# Patient Record
Sex: MALE | Employment: STUDENT | URBAN - METROPOLITAN AREA
[De-identification: names, ages, dates, MRNs, and addresses within clinical notes are randomized per-mention and may not be internally consistent; named-entity substitution may affect disease eponyms.]

---

## 2019-03-15 ENCOUNTER — TRANSFERRED RECORDS (OUTPATIENT)
Dept: HEALTH INFORMATION MANAGEMENT | Facility: CLINIC | Age: 29
End: 2019-03-15

## 2019-03-15 ENCOUNTER — MEDICAL CORRESPONDENCE (OUTPATIENT)
Dept: HEALTH INFORMATION MANAGEMENT | Facility: CLINIC | Age: 29
End: 2019-03-15

## 2019-03-22 ENCOUNTER — TELEPHONE (OUTPATIENT)
Dept: CARDIOLOGY | Facility: CLINIC | Age: 29
End: 2019-03-22

## 2019-03-22 NOTE — TELEPHONE ENCOUNTER
Called patient and cancelled Monday's appt with Dr. Felix. Will have congenital team reach out to patient for appropriate scheduling.

## 2019-04-01 ENCOUNTER — TELEPHONE (OUTPATIENT)
Dept: CARDIOLOGY | Facility: CLINIC | Age: 29
End: 2019-04-01

## 2019-04-01 DIAGNOSIS — Q24.0 DEXTROCARDIA: Primary | ICD-10-CM

## 2019-04-01 DIAGNOSIS — I07.1 TRICUSPID REGURGITATION: ICD-10-CM

## 2019-04-03 ENCOUNTER — TELEPHONE (OUTPATIENT)
Dept: CARDIOLOGY | Facility: CLINIC | Age: 29
End: 2019-04-03

## 2019-04-06 ENCOUNTER — TELEPHONE (OUTPATIENT)
Dept: CARDIOLOGY | Facility: CLINIC | Age: 29
End: 2019-04-06

## 2019-04-24 ENCOUNTER — CARE COORDINATION (OUTPATIENT)
Dept: CARDIOLOGY | Facility: CLINIC | Age: 29
End: 2019-04-24

## 2019-05-10 ENCOUNTER — ANCILLARY PROCEDURE (OUTPATIENT)
Dept: CARDIOLOGY | Facility: CLINIC | Age: 29
End: 2019-05-10
Attending: INTERNAL MEDICINE
Payer: COMMERCIAL

## 2019-05-10 DIAGNOSIS — I07.1 TRICUSPID REGURGITATION: ICD-10-CM

## 2019-05-10 DIAGNOSIS — Q24.0 DEXTROCARDIA: ICD-10-CM

## 2019-05-10 LAB
ALBUMIN SERPL-MCNC: 4.2 G/DL (ref 3.4–5)
ALP SERPL-CCNC: 65 U/L (ref 40–150)
ALT SERPL W P-5'-P-CCNC: 38 U/L (ref 0–70)
ANION GAP SERPL CALCULATED.3IONS-SCNC: 8 MMOL/L (ref 3–14)
AST SERPL W P-5'-P-CCNC: 34 U/L (ref 0–45)
BASOPHILS # BLD AUTO: 0.1 10E9/L (ref 0–0.2)
BASOPHILS NFR BLD AUTO: 1 %
BILIRUB SERPL-MCNC: 0.6 MG/DL (ref 0.2–1.3)
BUN SERPL-MCNC: 14 MG/DL (ref 7–30)
CALCIUM SERPL-MCNC: 9.5 MG/DL (ref 8.5–10.1)
CHLORIDE SERPL-SCNC: 107 MMOL/L (ref 94–109)
CHOLEST SERPL-MCNC: 205 MG/DL
CO2 SERPL-SCNC: 23 MMOL/L (ref 20–32)
CREAT SERPL-MCNC: 0.88 MG/DL (ref 0.66–1.25)
DIFFERENTIAL METHOD BLD: NORMAL
EOSINOPHIL # BLD AUTO: 0.2 10E9/L (ref 0–0.7)
EOSINOPHIL NFR BLD AUTO: 2.1 %
ERYTHROCYTE [DISTWIDTH] IN BLOOD BY AUTOMATED COUNT: 12 % (ref 10–15)
GFR SERPL CREATININE-BSD FRML MDRD: >90 ML/MIN/{1.73_M2}
GLUCOSE SERPL-MCNC: 91 MG/DL (ref 70–99)
HCT VFR BLD AUTO: 48.2 % (ref 40–53)
HDLC SERPL-MCNC: 45 MG/DL
HGB BLD-MCNC: 16.3 G/DL (ref 13.3–17.7)
IMM GRANULOCYTES # BLD: 0 10E9/L (ref 0–0.4)
IMM GRANULOCYTES NFR BLD: 0.1 %
LDLC SERPL CALC-MCNC: 93 MG/DL
LYMPHOCYTES # BLD AUTO: 2.4 10E9/L (ref 0.8–5.3)
LYMPHOCYTES NFR BLD AUTO: 30.5 %
MCH RBC QN AUTO: 29.9 PG (ref 26.5–33)
MCHC RBC AUTO-ENTMCNC: 33.8 G/DL (ref 31.5–36.5)
MCV RBC AUTO: 88 FL (ref 78–100)
MONOCYTES # BLD AUTO: 0.5 10E9/L (ref 0–1.3)
MONOCYTES NFR BLD AUTO: 6.9 %
NEUTROPHILS # BLD AUTO: 4.6 10E9/L (ref 1.6–8.3)
NEUTROPHILS NFR BLD AUTO: 59.4 %
NONHDLC SERPL-MCNC: 159 MG/DL
NRBC # BLD AUTO: 0 10*3/UL
NRBC BLD AUTO-RTO: 0 /100
PLATELET # BLD AUTO: 246 10E9/L (ref 150–450)
POTASSIUM SERPL-SCNC: 4.2 MMOL/L (ref 3.4–5.3)
PROT SERPL-MCNC: 8.5 G/DL (ref 6.8–8.8)
RBC # BLD AUTO: 5.45 10E12/L (ref 4.4–5.9)
SODIUM SERPL-SCNC: 138 MMOL/L (ref 133–144)
TRIGL SERPL-MCNC: 331 MG/DL
TSH SERPL DL<=0.005 MIU/L-ACNC: 1.73 MU/L (ref 0.4–4)
WBC # BLD AUTO: 7.8 10E9/L (ref 4–11)

## 2019-05-14 ENCOUNTER — OFFICE VISIT (OUTPATIENT)
Dept: CARDIOLOGY | Facility: CLINIC | Age: 29
End: 2019-05-14
Payer: COMMERCIAL

## 2019-05-14 ENCOUNTER — ANCILLARY PROCEDURE (OUTPATIENT)
Dept: CARDIOLOGY | Facility: CLINIC | Age: 29
End: 2019-05-14
Attending: INTERNAL MEDICINE
Payer: COMMERCIAL

## 2019-05-14 VITALS
WEIGHT: 186.3 LBS | HEART RATE: 70 BPM | OXYGEN SATURATION: 98 % | DIASTOLIC BLOOD PRESSURE: 79 MMHG | SYSTOLIC BLOOD PRESSURE: 120 MMHG

## 2019-05-14 DIAGNOSIS — R00.0 TACHYCARDIA: Primary | ICD-10-CM

## 2019-05-14 DIAGNOSIS — Q24.9 CONGENITAL HEART ANOMALY: ICD-10-CM

## 2019-05-14 DIAGNOSIS — R00.0 TACHYCARDIA: ICD-10-CM

## 2019-05-14 PROCEDURE — 93010 ELECTROCARDIOGRAM REPORT: CPT | Mod: ZP | Performed by: INTERNAL MEDICINE

## 2019-05-14 PROCEDURE — G0463 HOSPITAL OUTPT CLINIC VISIT: HCPCS

## 2019-05-14 PROCEDURE — 0298T ZZC EXT ECG > 48HR TO 21 DAY REVIEW AND INTERPRETATN: CPT | Performed by: INTERNAL MEDICINE

## 2019-05-14 PROCEDURE — 99203 OFFICE O/P NEW LOW 30 MIN: CPT | Mod: ZP | Performed by: INTERNAL MEDICINE

## 2019-05-14 PROCEDURE — 93005 ELECTROCARDIOGRAM TRACING: CPT | Mod: XU

## 2019-05-14 PROCEDURE — 0296T ZIO PATCH HOLTER ADULT PEDIATRIC GREATER THAN 48 HRS: CPT | Mod: ZF

## 2019-05-14 SDOH — HEALTH STABILITY: MENTAL HEALTH: HOW OFTEN DO YOU HAVE A DRINK CONTAINING ALCOHOL?: NOT ASKED

## 2019-05-14 NOTE — NURSING NOTE
"Chief Complaint   Patient presents with     New Patient     28 yo male with PMH significant for dextrocardia and \"failing valve\" presenting for evaluation     Vitals were taken and medications were reconciled.     Chrissie Whalen RMA  2:15 PM    "

## 2019-05-14 NOTE — NURSING NOTE
"Chief Complaint   Patient presents with     New Patient     28 yo male with PMH significant for dextrocardia and \"failing valve\" presenting for evaluation        Cardiac Monitors: Patient was instructed regarding the indication, function, care and prompt return of a holter  monitor. The monitor was placed on the patient with instructions regarding care of the skin electrodes and monitor, as well as documentation in the patient diary. Patient demonstrated understanding of this information and agreed to call with further questions or concerns.  Cardiac Testing: Patient given instructions regarding  CPX and MRI. Discussed purpose, preparation, procedure and when to expect results reported back to the patient. Patient demonstrated understanding of this information and agreed to call with further questions or concerns.  Med Reconcile: Reviewed and verified all current medications with the patient. The updated medication list was printed and given to the patient.  Return Appointment: Patient given instructions regarding scheduling next clinic visit. Patient demonstrated understanding of this information and agreed to call with further questions or concerns.  Patient stated he understood all health information given and agreed to call with further questions or concerns.     Bart Johnson RN, BSN  Cardiology Care Coordinator  [facility]  [email or web address]  [phone]       "

## 2019-05-14 NOTE — LETTER
5/14/2019      RE: Robert Ge  [address]  [facility] [address]       Dear Colleague,    Thank you for the opportunity to participate in the care of your patient, Robert Ge, at the [facility]ART CARE at [facility]. Please see a copy of my visit note below.    CARDIOLOGY CONSULTATION:  [facility]    Please see dictated note    PAST MEDICAL HISTORY:  No past medical history on file.    CURRENT MEDICATIONS:  No current outpatient medications on file.       PAST SURGICAL HISTORY:  No past surgical history on file.    ALLERGIES  Patient has no known allergies.    FAMILY HX:  No family history on file.    SOCIAL HX:  Social History     Socioeconomic History     Marital status: Single     Spouse name: None     Number of children: None     Years of education: None     Highest education level: None   Occupational History     None   Social Needs     Financial resource strain: None     Food insecurity:     Worry: None     Inability: None     Transportation needs:     Medical: None     Non-medical: None   Tobacco Use     Smoking status: Never Smoker     Smokeless tobacco: Never Used   Substance and Sexual Activity     Alcohol use: Yes     Drug use: Never     Sexual activity: None   Lifestyle     Physical activity:     Days per week: None     Minutes per session: None     Stress: None   Relationships     Social connections:     Talks on phone: None     Gets together: None     Attends Protestant service: None     Active member of club or organization: None     Attends meetings of clubs or organizations: None     Relationship status: None     Intimate partner violence:     Fear of current or ex partner: None     Emotionally abused: None     Physically abused: None     Forced sexual activity: None   Other Topics Concern     None   Social History Narrative     None       ROS:  Constitutional: No fever, chills, or sweats. No weight gain/loss.   ENT: No  visual disturbance, ear ache, epistaxis, sore throat.   Allergies/Immunologic: Negative.   Respiratory: No cough, hemoptysis.   Cardiovascular: As per HPI.   GI: No nausea, vomiting, hematemesis, melena, or hematochezia.   : No urinary frequency, dysuria, or hematuria.   Integument: Negative.   Psychiatric: Negative.   Neuro: Negative.   Endocrinology: Negative.   Musculoskeletal: No myalgia.    VITAL SIGNS:  /79   Pulse 70   Wt 84.5 kg (186 lb 4.8 oz)   SpO2 98%   There is no height or weight on file to calculate BMI.  Wt Readings from Last 2 Encounters:   05/14/19 84.5 kg (186 lb 4.8 oz)       PHYSICAL EXAM  Robert Ge IS A 29 year old male.in no acute distress.  HEENT: Unremarkable.  Neck: JVP normal.  Carotids +4/4 bilaterally without bruits.  Lungs: CTA.  Cor: RRR. Normal S1 and S2.  + sternal lift. There is no significant murmur.  Abd: Soft, nontender, nondistended.  NABS.  No pulsatile mass.  Extremities: No C/C/E.  Pulses +4/4 symmetric in upper and lower extremities.  Neuro: Grossly intact.    LABS    Lab Results   Component Value Date    WBC 7.8 05/10/2019     Lab Results   Component Value Date    RBC 5.45 05/10/2019     Lab Results   Component Value Date    HGB 16.3 05/10/2019     Lab Results   Component Value Date    HCT 48.2 05/10/2019     No components found for: MCT  Lab Results   Component Value Date    MCV 88 05/10/2019     Lab Results   Component Value Date    MCH 29.9 05/10/2019     Lab Results   Component Value Date    MCHC 33.8 05/10/2019     Lab Results   Component Value Date    RDW 12.0 05/10/2019     Lab Results   Component Value Date     05/10/2019      Recent Labs   Lab Test 05/10/19  1223      POTASSIUM 4.2   CHLORIDE 107   CO2 23   ANIONGAP 8   GLC 91   BUN 14   CR 0.88   CAIT 9.5     Recent Labs   Lab Test 05/10/19  1223   CHOL 205*   HDL 45   LDL 93   TRIG 331*   NHDL 159*        GABRIEL Almaraz MD     Service Date: 05/14/2019      HISTORY OF PRESENT  ILLNESS:  Mr. 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  He has a past medical history significant for situs inversus totalis per report and an issue with his right side of his heart with a leaking valve.  He has not established with a cardiologist here in the [facility].  He does have an adult cardiologist back in [place] and had been followed by a pediatric cardiologist since he was 4-5 years old and this abnormality was discovered after he presented with a cough.  He was never limited in activity.  He played sports and was very active.  In his 20s he had an episode of palpitations but no other events up until a few weeks ago.  He awoke suddenly at 2:00 in the morning.  His heart was racing fast and he had a sharp chest pain and felt profoundly dizzy.  He went to his neighbor and they laid him down on the ground and his symptoms immediately resolved and he was back to normal.  This episode, however, scared him quite a bit, and he went to [place] to establish care and then referred to our program.  He did undergo an echocardiogram.  I reviewed those images personally.  His right ventricle appears to be moderately enlarged with low systolic function.  He just has mild AV valve regurgitation on that side.  We could not assess the pulmonary valve.  The left-sided AV valve is working well, and his saturations were normal.  He has never had any surgeries.  There is no family history of congenital heart disease or early coronary artery disease.  He has 1 brother and 1 sister.  His parents are living and healthy.  He smoked remotely, quit in 2014, just a pack a week for a couple of years.  He is actually fairly active.  He tries to run 5 miles at a time and now is able to complete that in about 45 minutes.  On the day that he had this event he had done a run around 2 p.m., felt good with it  and then had a full pizza around 8 p.m., but again it was not until 2 a.m. that he had this event.      He did undergo labs as part of his evaluation today.  His triglycerides are a bit up at 331.  His LDL is 93 with an HDL of 45.  His liver function tests are normal.  Creatinine is normal.  CBC is normal.  His glucose is 91.  Normal TSH at 1.7.      IMPRESSION, REPORT, PLAN:   1.  History of situs inversus totalis.   2.  Moderate RV enlargement with mildly decreased function of unclear etiology, query PI.   3.  Episode of palpitations associated with dizziness.   4.  Hypertriglyceridemia.      DISCUSSION:  It was a pleasure to be involved in the care of Mr. Nadiya Ge.  As noted, I reviewed all of his imaging personally.  At this time, I cannot find a clear etiology for his RV enlargement as we were unable to assess the pulmonary outflow.  It is possible that he has PI and that is the etiology, and they were unable to see it on the echo, which was limited.  I would like to get a cardiac MRI.  However, he is leaving this Friday to go to [place] until August, so we will plan to do it when he returns.  I also discussed doing a 14-day Zio Patch monitor so that if he has any additional events that we will be able to capture them, and doing a cardiopulmonary stress test to assess both cardiac limitation and pulmonary limitation.  I will plan to see him back and make further recommendations when more is available, and he will work on getting his records from [place].  It was a pleasure being involved in his care.  Please do not hesitate to contact me with any questions or concerns.         SANTOS MARIN MD             D: 2019   T: 2019   MT: KAVITA      Name:     LANNY DUCKWORTH   MRN:      [id number]        Account:      [id number]   :      1990           Service Date: 2019      Document: [id number]

## 2019-05-14 NOTE — PATIENT INSTRUCTIONS
You were seen today in the Adult Congenital and Cardiovascular Genetics Clinic at the [facility].    Cardiology Providers you saw during your visit:  Dr. Woodruff March     Diagnosis:  cognenital heart defect    Results:  The results of your echo were discussed with you today    Recommendations:    1.  Continue to eat a heart healthy, low salt diet.  2.  Continue to get 20-30 minutes of aerobic activity, 4-5 days per week.  Examples of aerobic activity include walking, running, swimming, cycling, etc.  3.  Continue to observe good oral hygiene, with regular dental visits.  4.  Please have a stress test this week - we will call you with the results.   5. Please wear a Ziopatch monitor for 14 days and we will call you with the results.  6. Please have a cardiac MRI/MRA in August.    Cardiac MRI  Magnetic resonance imaging (MRI) is a test that lets your doctor see detailed pictures of the inside of your body. MRI combines the use of strong magnets and radio waves to form an MRI image. A Cardiac MRI will give a detailed image of your heart.     Follow these instructions:  1. Please no eating or drinking 3 hours prior to the procedure.   2. No caffeine, alcohol or tobacco 12 hours prior to the procedure,    During the procedure:  1.Please arrive to the [facility] Waiting Room in the [facility].   2. You will be required to lay flat and follow breath-hold instructions.   3. You will need to remove all metal and answer a safety questionnaire. Please wear clothes without metal (snaps and zippers). Please remove any body piercings and hair extensions before you arrive. You will also remove watches, jewelry, hairpins, wallets, dentures, partial dental plates and hearing aids. You may wear contact lenses, and you may be able to wear your rings. We have a safe place to keep your personal items, but it is safer to leave them at home.  4. You will be given IV contrast for this exam.  To prepare:  The day before the exam  drink extra fluid - at least six 8oz glasses (unless you are on a fluid restriction per your doctor)       Vitals:    05/14/19 1413 05/14/19 1502   BP: 133/81 120/79   BP Location: Left arm    Patient Position: Chair    Cuff Size: Adult Regular    Pulse: 70    SpO2: 98%    Weight: 84.5 kg (186 lb 4.8 oz)      Exercise restrictions:   Yes__X__  No____         If yes, list restrictions:  Must be allowed to rest if fatigued or SOB      Work restrictions:  Yes____  No_X___         If yes, list restrictions:    FASTING CHOLESTEROL was checked in the last 5 years YES_X__  NO___   Continue to eat a heart healthy, low salt diet.         ____ Fasting lipid panel order today         ____ No changes in medications          ____ I recommend the following changes in your cholesterol medications.:          ____ Please follow up for cholesterol screening at your primary care physician      Follow-up:  Follow up with Dr. Almaraz in August with a cMRI/MRA.    If you have questions or concerns please contact us at:    Bart Johnson RN, BSN   Aries Martin (Scheduling)  Nurse Coordinator     Clinic   Adult Congenital and CV Genetics Adult Congenital and CV Genetic  [facility]  (P)[phone]    (P) [phone]  [email or web address] (F)[phone]        For after hours urgent needs, call [phone] and ask to speak to the Adult Congenital Physician on call.  Mention Job Code 0401.    For emergencies call 911.    [facility]   Clinics and Surgery Center  Mail Code [id number]  [address] [place] [address]  [id number]

## 2019-05-14 NOTE — PROGRESS NOTES
Per Dr. Almaraz, patient to have 14 day Zio monitor placed.  Diagnosis: Tachycardia  Monitor placed: Yes  Patient Instructed: Yes  Patient verbalized understanding: Yes  Holter # [id number]  Placed by Jan Sahu

## 2019-05-14 NOTE — PROGRESS NOTES
CARDIOLOGY CONSULTATION:  [facility]    Please see dictated note    PAST MEDICAL HISTORY:  No past medical history on file.    CURRENT MEDICATIONS:  No current outpatient medications on file.       PAST SURGICAL HISTORY:  No past surgical history on file.    ALLERGIES  Patient has no known allergies.    FAMILY HX:  No family history on file.    SOCIAL HX:  Social History     Socioeconomic History     Marital status: Single     Spouse name: None     Number of children: None     Years of education: None     Highest education level: None   Occupational History     None   Social Needs     Financial resource strain: None     Food insecurity:     Worry: None     Inability: None     Transportation needs:     Medical: None     Non-medical: None   Tobacco Use     Smoking status: Never Smoker     Smokeless tobacco: Never Used   Substance and Sexual Activity     Alcohol use: Yes     Drug use: Never     Sexual activity: None   Lifestyle     Physical activity:     Days per week: None     Minutes per session: None     Stress: None   Relationships     Social connections:     Talks on phone: None     Gets together: None     Attends Congregation service: None     Active member of club or organization: None     Attends meetings of clubs or organizations: None     Relationship status: None     Intimate partner violence:     Fear of current or ex partner: None     Emotionally abused: None     Physically abused: None     Forced sexual activity: None   Other Topics Concern     None   Social History Narrative     None       ROS:  Constitutional: No fever, chills, or sweats. No weight gain/loss.   ENT: No visual disturbance, ear ache, epistaxis, sore throat.   Allergies/Immunologic: Negative.   Respiratory: No cough, hemoptysis.   Cardiovascular: As per HPI.   GI: No nausea, vomiting, hematemesis, melena, or hematochezia.   : No urinary frequency, dysuria, or hematuria.   Integument: Negative.   Psychiatric: Negative.   Neuro: Negative.    Endocrinology: Negative.   Musculoskeletal: No myalgia.    VITAL SIGNS:  /79   Pulse 70   Wt 84.5 kg (186 lb 4.8 oz)   SpO2 98%   There is no height or weight on file to calculate BMI.  Wt Readings from Last 2 Encounters:   05/14/19 84.5 kg (186 lb 4.8 oz)       PHYSICAL EXAM  Robert Ge IS A 29 year old male.in no acute distress.  HEENT: Unremarkable.  Neck: JVP normal.  Carotids +4/4 bilaterally without bruits.  Lungs: CTA.  Cor: RRR. Normal S1 and S2.  + sternal lift. There is no significant murmur.  Abd: Soft, nontender, nondistended.  NABS.  No pulsatile mass.  Extremities: No C/C/E.  Pulses +4/4 symmetric in upper and lower extremities.  Neuro: Grossly intact.    LABS    Lab Results   Component Value Date    WBC 7.8 05/10/2019     Lab Results   Component Value Date    RBC 5.45 05/10/2019     Lab Results   Component Value Date    HGB 16.3 05/10/2019     Lab Results   Component Value Date    HCT 48.2 05/10/2019     No components found for: MCT  Lab Results   Component Value Date    MCV 88 05/10/2019     Lab Results   Component Value Date    MCH 29.9 05/10/2019     Lab Results   Component Value Date    MCHC 33.8 05/10/2019     Lab Results   Component Value Date    RDW 12.0 05/10/2019     Lab Results   Component Value Date     05/10/2019      Recent Labs   Lab Test 05/10/19  1223      POTASSIUM 4.2   CHLORIDE 107   CO2 23   ANIONGAP 8   GLC 91   BUN 14   CR 0.88   CAIT 9.5     Recent Labs   Lab Test 05/10/19  1223   CHOL 205*   HDL 45   LDL 93   TRIG 331*   NHDL 159*        GABRIEL Almaraz MD

## 2019-05-15 LAB — INTERPRETATION ECG - MUSE: NORMAL

## 2019-05-15 NOTE — PROGRESS NOTES
Service Date: 05/14/2019      HISTORY OF PRESENT ILLNESS:  Mr. 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  He has a past medical history significant for situs inversus totalis per report and an issue with his right side of his heart with a leaking valve.  He has not established with a cardiologist here in the [facility].  He does have an adult cardiologist back in [place] and had been followed by a pediatric cardiologist since he was 4-5 years old and this abnormality was discovered after he presented with a cough.  He was never limited in activity.  He played sports and was very active.  In his 20s he had an episode of palpitations but no other events up until a few weeks ago.  He awoke suddenly at 2:00 in the morning.  His heart was racing fast and he had a sharp chest pain and felt profoundly dizzy.  He went to his neighbor and they laid him down on the ground and his symptoms immediately resolved and he was back to normal.  This episode, however, scared him quite a bit, and he went to [place] to establish care and then referred to our program.  He did undergo an echocardiogram.  I reviewed those images personally.  His right ventricle appears to be moderately enlarged with low systolic function.  He just has mild AV valve regurgitation on that side.  We could not assess the pulmonary valve.  The left-sided AV valve is working well, and his saturations were normal.  He has never had any surgeries.  There is no family history of congenital heart disease or early coronary artery disease.  He has 1 brother and 1 sister.  His parents are living and healthy.  He smoked remotely, quit in 2014, just a pack a week for a couple of years.  He is actually fairly active.  He tries to run 5 miles at a time and now is able to complete that in about 45 minutes.  On the day that he had this event he  had done a run around 2 p.m., felt good with it and then had a full pizza around 8 p.m., but again it was not until 2 a.m. that he had this event.      He did undergo labs as part of his evaluation today.  His triglycerides are a bit up at 331.  His LDL is 93 with an HDL of 45.  His liver function tests are normal.  Creatinine is normal.  CBC is normal.  His glucose is 91.  Normal TSH at 1.7.      IMPRESSION, REPORT, PLAN:   1.  History of situs inversus totalis.   2.  Moderate RV enlargement with mildly decreased function of unclear etiology, query PI.   3.  Episode of palpitations associated with dizziness.   4.  Hypertriglyceridemia.      DISCUSSION:  It was a pleasure to be involved in the care of Mr. Nadiya Ge.  As noted, I reviewed all of his imaging personally.  At this time, I cannot find a clear etiology for his RV enlargement as we were unable to assess the pulmonary outflow.  It is possible that he has PI and that is the etiology, and they were unable to see it on the echo, which was limited.  I would like to get a cardiac MRI.  However, he is leaving this Friday to go to [place] until August, so we will plan to do it when he returns.  I also discussed doing a 14-day Zio Patch monitor so that if he has any additional events that we will be able to capture them, and doing a cardiopulmonary stress test to assess both cardiac limitation and pulmonary limitation.  I will plan to see him back and make further recommendations when more is available, and he will work on getting his records from [place].  It was a pleasure being involved in his care.  Please do not hesitate to contact me with any questions or concerns.         SANTOS MARIN MD             D: 2019   T: 2019   MT: KAVITA      Name:     LANNY DUCKWORTH   MRN:      [id number]        Account:      [id number]   :      1990           Service Date: 2019      Document: [id number]

## 2019-08-19 PROBLEM — Q24.9 CONGENITAL ANOMALIES OF THE HEART: Status: ACTIVE | Noted: 2019-08-19

## 2019-08-19 PROBLEM — Q89.3 SITUS INVERSUS TOTALIS: Status: ACTIVE | Noted: 2019-08-19

## 2019-09-06 ENCOUNTER — HOSPITAL ENCOUNTER (OUTPATIENT)
Dept: MRI IMAGING | Facility: CLINIC | Age: 29
Discharge: HOME | End: 2019-09-06
Attending: INTERNAL MEDICINE | Admitting: INTERNAL MEDICINE
Payer: COMMERCIAL

## 2019-09-06 ENCOUNTER — HOSPITAL ENCOUNTER (OUTPATIENT)
Dept: CARDIOLOGY | Facility: CLINIC | Age: 29
End: 2019-09-06
Attending: INTERNAL MEDICINE
Payer: COMMERCIAL

## 2019-09-06 ENCOUNTER — HOSPITAL ENCOUNTER (OUTPATIENT)
Dept: MRI IMAGING | Facility: CLINIC | Age: 29
End: 2019-09-06
Attending: INTERNAL MEDICINE
Payer: COMMERCIAL

## 2019-09-06 VITALS
HEIGHT: 70 IN | DIASTOLIC BLOOD PRESSURE: 73 MMHG | HEART RATE: 94 BPM | OXYGEN SATURATION: 95 % | SYSTOLIC BLOOD PRESSURE: 109 MMHG | BODY MASS INDEX: 25.6 KG/M2 | WEIGHT: 178.8 LBS

## 2019-09-06 DIAGNOSIS — R00.0 TACHYCARDIA: ICD-10-CM

## 2019-09-06 DIAGNOSIS — Q24.9 CONGENITAL HEART ANOMALY: ICD-10-CM

## 2019-09-06 PROCEDURE — 25500064 ZZH RX 255 OP 636: Performed by: INTERNAL MEDICINE

## 2019-09-06 PROCEDURE — 94621 CARDIOPULM EXERCISE TESTING: CPT

## 2019-09-06 PROCEDURE — A9585 GADOBUTROL INJECTION: HCPCS | Performed by: INTERNAL MEDICINE

## 2019-09-06 PROCEDURE — 75561 CARDIAC MRI FOR MORPH W/DYE: CPT

## 2019-09-06 PROCEDURE — 99214 OFFICE O/P EST MOD 30 MIN: CPT | Mod: ZP | Performed by: INTERNAL MEDICINE

## 2019-09-06 PROCEDURE — 71555 MRI ANGIO CHEST W OR W/O DYE: CPT

## 2019-09-06 PROCEDURE — 75561 CARDIAC MRI FOR MORPH W/DYE: CPT | Mod: 26 | Performed by: INTERNAL MEDICINE

## 2019-09-06 PROCEDURE — 71555 MRI ANGIO CHEST W OR W/O DYE: CPT | Mod: 26 | Performed by: INTERNAL MEDICINE

## 2019-09-06 PROCEDURE — 75565 CARD MRI VELOC FLOW MAPPING: CPT | Mod: 26 | Performed by: INTERNAL MEDICINE

## 2019-09-06 PROCEDURE — G0463 HOSPITAL OUTPT CLINIC VISIT: HCPCS | Mod: 25,ZF

## 2019-09-06 PROCEDURE — 94621 CARDIOPULM EXERCISE TESTING: CPT | Mod: 26 | Performed by: INTERNAL MEDICINE

## 2019-09-06 RX ORDER — GADOBUTROL 604.72 MG/ML
10 INJECTION INTRAVENOUS ONCE
Status: COMPLETED | OUTPATIENT
Start: 2019-09-06 | End: 2019-09-06

## 2019-09-06 RX ORDER — METOPROLOL SUCCINATE 25 MG/1
25 TABLET, EXTENDED RELEASE ORAL DAILY
Qty: 90 TABLET | Refills: 3 | Status: SHIPPED | OUTPATIENT
Start: 2019-09-06

## 2019-09-06 RX ADMIN — GADOBUTROL 10 ML: 604.72 INJECTION INTRAVENOUS at 09:02

## 2019-09-06 ASSESSMENT — MIFFLIN-ST. JEOR: SCORE: 1778.53

## 2019-09-06 ASSESSMENT — PAIN SCALES - GENERAL: PAINLEVEL: NO PAIN (0)

## 2019-09-06 NOTE — PROGRESS NOTES
"Service Date: 09/06/19     HISTORY OF PRESENT ILLNESS:  Mr. 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  He has a past medical history significant for situs inversus totalis per report and an issue with his right side of his heart with a leaking valve.  He has not established with a cardiologist here in the [facility].  He does have an adult cardiologist back in [place] and had been followed by a pediatric cardiologist since he was 4-5 years old and this abnormality was discovered after he presented with a cough.  He was never limited in activity.  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    At his last visit he had an episode of palpitations that was with associated chest discomfort and awoke him from sleep.  He had a ZioPatch obtained and had no symptoms during the time he wore the patch or at any time in the interim since his last visit.  He denies any heart failure symptoms, chest pain or palpitations.  No lightheadedness dizziness or syncopal episodes.    Today, he underwent stress testing which he did very well on.  He worked for 14:[employer] meeting 13 mets.  He stopped due to leg fatigue but had no cardiac symptoms.    Past Medical History:  Situs inversus totalis    Social History:    Prior tobacco use, current PhD student    Current Medications:  No cardiac medications    Physical Examination:  /73 (BP Location: Right arm, Patient Position: Chair, Cuff Size: Adult Regular)   Pulse 94   Ht 1.78 m (5' 10.08\")   Wt 81.1 kg (178 lb 12.8 oz)   SpO2 95%   BMI 25.60 kg/m    Gen: A&O  Neck: No JVD  Heart:  Regular rate and rhythm with a loud P2,    No obvious murmur is noted.  Lungs: Clear anteriorly without wheeze  Abdomen: Soft, NT  Ext: WWP, non-edematous    MRI:  Personally reviewed.  Situs " inversus with dextrocardia is noted.  There is no significant pulmonic insufficiency noted.    Stress Test (official report is pending)            ZioPatch:       IMPRESSION, REPORT, PLAN:   1.  History of dextrocardia situs inversus totalis with CCTGA.   2.  Moderate RV enlargement with mildly decreased function without significant AV valve regurgitation in patient with CCTGA  3.  Episode of palpitations associated with dizziness - SVT   4.  Hypertriglyceridemia.      DISCUSSION:  It was a pleasure to be involved in the care of Mr. Nadiya Ge.  I have reviewed the results of his stress which demonstrated very good functional capacity.  The results of his MRI demonstrated that in addition to situs inversus totalis he has CCTGA but no significant AV valve insufficiency was noted.  His Ziopatch did demonstrate episodes of NS SVT - the longest was just 16 beats.  He was symptomatic with 1 event that he awoke from sleep with and was SVT.  I suspect that he may have had a longer episode of this that was symptomatic at the time he first came to clinic.  We offered him a low-dose beta-blocker today, which he agreed to.  He will let us know should he develop any symptoms. We discussed that should he have more significant events in the future, we could consider ablation.     Will plan to see him back in a year with an echo.  I discussed his anatomy through our heart model, which he understood.    It was a pleasure being involved in his care.  Please do not hesitate to contact me with any questions or concerns.     GABRIEL Almaraz MD   Agree with edited note and plan of care.

## 2019-09-06 NOTE — PATIENT INSTRUCTIONS
"You were seen today in the Adult Congenital and Cardiovascular Genetics Clinic at the [facility].    Cardiology Providers you saw during your visit:  Dr. GABRIEL Almaraz     Diagnosis:  PMH significant for situs inversus totalis and moderate RV enlargement presenting for follow up    Results:  Dr. Almaraz reviewed the results of your imaging with you today.    Recommendations:    1. Continue to eat a heart healthy, low salt diet.  2. Continue to get 20-30 minutes of aerobic activity, 4-5 days per week.  Examples of aerobic activity include walking, running, swimming, cycling, etc.  3. Continue to observe good oral hygiene, with regular dental visits.  4. Begin taking metoporol 25mg daily. A prescription has been sent to your pharmacy.      Vitals:    09/06/19 1245   BP: 109/73   BP Location: Right arm   Patient Position: Chair   Cuff Size: Adult Regular   Pulse: 94   SpO2: 95%   Weight: 81.1 kg (178 lb 12.8 oz)   Height: 1.78 m (5' 10.08\")       SBE prophylaxis:   Yes____  No____    Lifelong Bacterial Endocarditis Prophylaxis:  YES____  NO____    If YES is checked, follow the recommendations outlined below:  1. Take antibiotic(s) prior to recommended dental procedures and procedures on the respiratory tract or with infected skin, muscle or bones. SBE prophylaxis is not needed for routine GI and  procedures (ie. Colonoscopy or vaginal delivery)  2. Observe good oral hygiene daily, as advised by your dentist. Get regular professional dental care.  3. Keep cuts clean.  4. Infections should be treated promptly.  5. Symptoms of Infective Endocarditis could include: fever lasting more than 4-5 days or a recurrent fever that initially resolves but returns within 1-2 days)      Exercise restrictions:   Yes__X__  No____         If yes, list restrictions:  Must be allowed to rest if fatigued or SOB      Work restrictions:  Yes____  No_X___         If yes, list restrictions:    FASTING CHOLESTEROL was checked in " the last 5 years YES___  NO___ (2019)  Continue to eat a heart healthy, low salt diet.         ____ Fasting lipid panel order today         ____ No changes in medications          ____ I recommend the following changes in your cholesterol medications.:          ____ Please follow up for cholesterol screening at your primary care physician      Follow-up:  Follow up with Dr. Almaraz in one year with an echocardiogram.    If you have questions or concerns please contact us at:    Diane Masters, MSN, RN, CNL    Irish Pappas (Scheduling)  Nurse Care Coordinator     Clinic   Adult Congenital and CV Genetics   Adult Congenital and CV Genetic  [facility]   [facility]  (P) [phone]     (P) [phone]  [email or web address]   (F) [phone]        For after hours urgent needs, call [phone] and ask to speak to the Adult Congenital Physician on call.  Mention Job Code [phone].    For emergencies call 911.    [facility]  [facility]  Mail Code [id number]  [address] [place]  [address]

## 2019-09-06 NOTE — NURSING NOTE
Chief Complaint   Patient presents with     Follow Up     28 yo male with PMH significant for situs inversus totalis and moderate RV enlargement presenting for follow up     Vitals were taken and medications were reconciled.     Chrissie CABRERA  12:47 PM

## 2019-09-06 NOTE — LETTER
9/6/2019      RE: Robert Ge  [address]  [facility] [address]       Dear Colleague,    Thank you for the opportunity to participate in the care of your patient, Robert Ge, at the [facility]. Please see a copy of my visit note below.    Service Date: 09/06/19     HISTORY OF PRESENT ILLNESS:  Mr. 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  He has a past medical history significant for situs inversus totalis per report and an issue with his right side of his heart with a leaking valve.  He has not established with a cardiologist here in the [facility].  He does have an adult cardiologist back in [place] and had been followed by a pediatric cardiologist since he was 4-5 years old and this abnormality was discovered after he presented with a cough.  He was never limited in activity.  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    At his last visit he had an episode of palpitations that was with associated chest discomfort and awoke him from sleep.  He had a ZioPatch obtained and had no symptoms during the time he wore the patch or at any time in the interim since his last visit.  He denies any heart failure symptoms, chest pain or palpitations.  No lightheadedness dizziness or syncopal episodes.    Today, he underwent stress testing which he did very well on.  He worked for 14:[employer] meeting 13 met[employer].  He stopped due to leg fatigue but had no cardiac symptoms.    Past Medical History:  Situs inversus totalis    Social History:    Prior tobacco use, current PhD student    Current Medications:  No cardiac medications    Physical Examination:  /73 (BP Location: Right arm, Patient Position: Chair, Cuff Size:  "Adult Regular)   Pulse 94   Ht 1.78 m (5' 10.08\")   Wt 81.1 kg (178 lb 12.8 oz)   SpO2 95%   BMI 25.60 kg/m     Gen: A&O  Neck: No JVD  Heart:  Regular rate and rhythm with a loud P2,    No obvious murmur is noted.  Lungs: Clear anteriorly without wheeze  Abdomen: Soft, NT  Ext: WWP, non-edematous    MRI:  Personally reviewed.  Situs inversus with dextrocardia is noted.  There is no significant pulmonic insufficiency noted.    Stress Test (official report is pending)            ZioPatch:       IMPRESSION, REPORT, PLAN:   1.  History of dextrocardia situs inversus totalis with CCTGA.   2.  Moderate RV enlargement with mildly decreased function without significant AV valve regurgitation in patient with CCTGA  3.  Episode of palpitations associated with dizziness - SVT   4.  Hypertriglyceridemia.      DISCUSSION:  It was a pleasure to be involved in the care of Mr. Nadiya Ge.  I have reviewed the results of his stress which demonstrated very good functional capacity.  The results of his MRI demonstrated that in addition to situs inversus totalis he has CCTGA but no significant AV valve insufficiency was noted.  His Ziopatch did demonstrate episodes of NS SVT - the longest was just 16 beats.  He was symptomatic with 1 event that he awoke from sleep with and was SVT.  I suspect that he may have had a longer episode of this that was symptomatic at the time he first came to clinic.  We offered him a low-dose beta-blocker today, which he agreed to.  He will let us know should he develop any symptoms. We discussed that should he have more significant events in the future, we could consider ablation.     Will plan to see him back in a year with an echo.  I discussed his anatomy through our heart model, which he understood.    It was a pleasure being involved in his care.  Please do not hesitate to contact me with any questions or concerns.     GABRIEL Almaraz MD   Agree with edited note and plan of care.   "

## 2019-09-06 NOTE — NURSING NOTE
Med Reconcile: Reviewed and verified all current medications with the patient. The updated medication list was printed and given to the patient.    Return Appointment: Patient given instructions regarding scheduling next clinic visit. Patient demonstrated understanding of this information and agreed to call with further questions or concerns.  Patient stated he understood all health information given and agreed to call with further questions or concerns.

## 2019-09-18 ENCOUNTER — HOSPITAL ENCOUNTER (OUTPATIENT)
Facility: CLINIC | Age: 29
Setting detail: OBSERVATION
Discharge: HOME | End: 2019-09-19
Attending: EMERGENCY MEDICINE | Admitting: EMERGENCY MEDICINE
Payer: COMMERCIAL

## 2019-09-18 ENCOUNTER — APPOINTMENT (OUTPATIENT)
Dept: GENERAL RADIOLOGY | Facility: CLINIC | Age: 29
End: 2019-09-18
Attending: EMERGENCY MEDICINE
Payer: COMMERCIAL

## 2019-09-18 DIAGNOSIS — R07.9 CHEST PAIN, UNSPECIFIED TYPE: ICD-10-CM

## 2019-09-18 LAB
ALBUMIN SERPL-MCNC: 4.2 G/DL (ref 3.4–5)
ALP SERPL-CCNC: 63 U/L (ref 40–150)
ALT SERPL W P-5'-P-CCNC: 37 U/L (ref 0–70)
ANION GAP SERPL CALCULATED.3IONS-SCNC: 5 MMOL/L (ref 3–14)
AST SERPL W P-5'-P-CCNC: 22 U/L (ref 0–45)
BASOPHILS # BLD AUTO: 0.1 10E9/L (ref 0–0.2)
BASOPHILS NFR BLD AUTO: 1 %
BILIRUB SERPL-MCNC: 0.4 MG/DL (ref 0.2–1.3)
BUN SERPL-MCNC: 12 MG/DL (ref 7–30)
CALCIUM SERPL-MCNC: 9.1 MG/DL (ref 8.5–10.1)
CHLORIDE SERPL-SCNC: 107 MMOL/L (ref 94–109)
CO2 SERPL-SCNC: 29 MMOL/L (ref 20–32)
CREAT SERPL-MCNC: 1.1 MG/DL (ref 0.66–1.25)
D DIMER PPP FEU-MCNC: 0.9 UG/ML FEU (ref 0–0.5)
DIFFERENTIAL METHOD BLD: NORMAL
EOSINOPHIL # BLD AUTO: 0.3 10E9/L (ref 0–0.7)
EOSINOPHIL NFR BLD AUTO: 4 %
ERYTHROCYTE [DISTWIDTH] IN BLOOD BY AUTOMATED COUNT: 12 % (ref 10–15)
GFR SERPL CREATININE-BSD FRML MDRD: 89 ML/MIN/{1.73_M2}
GLUCOSE SERPL-MCNC: 84 MG/DL (ref 70–99)
HCT VFR BLD AUTO: 49.4 % (ref 40–53)
HGB BLD-MCNC: 16.5 G/DL (ref 13.3–17.7)
IMM GRANULOCYTES # BLD: 0 10E9/L (ref 0–0.4)
IMM GRANULOCYTES NFR BLD: 0.3 %
LYMPHOCYTES # BLD AUTO: 2.7 10E9/L (ref 0.8–5.3)
LYMPHOCYTES NFR BLD AUTO: 37.2 %
MCH RBC QN AUTO: 29.7 PG (ref 26.5–33)
MCHC RBC AUTO-ENTMCNC: 33.4 G/DL (ref 31.5–36.5)
MCV RBC AUTO: 89 FL (ref 78–100)
MONOCYTES # BLD AUTO: 0.6 10E9/L (ref 0–1.3)
MONOCYTES NFR BLD AUTO: 8 %
NEUTROPHILS # BLD AUTO: 3.6 10E9/L (ref 1.6–8.3)
NEUTROPHILS NFR BLD AUTO: 49.5 %
NRBC # BLD AUTO: 0 10*3/UL
NRBC BLD AUTO-RTO: 0 /100
PLATELET # BLD AUTO: 242 10E9/L (ref 150–450)
POTASSIUM SERPL-SCNC: 3.8 MMOL/L (ref 3.4–5.3)
PROT SERPL-MCNC: 8.3 G/DL (ref 6.8–8.8)
RBC # BLD AUTO: 5.56 10E12/L (ref 4.4–5.9)
SODIUM SERPL-SCNC: 142 MMOL/L (ref 133–144)
TROPONIN I SERPL-MCNC: <0.015 UG/L (ref 0–0.04)
WBC # BLD AUTO: 7.3 10E9/L (ref 4–11)

## 2019-09-18 PROCEDURE — 84484 ASSAY OF TROPONIN QUANT: CPT | Performed by: EMERGENCY MEDICINE

## 2019-09-18 PROCEDURE — 93005 ELECTROCARDIOGRAM TRACING: CPT | Performed by: EMERGENCY MEDICINE

## 2019-09-18 PROCEDURE — 71046 X-RAY EXAM CHEST 2 VIEWS: CPT

## 2019-09-18 PROCEDURE — 80053 COMPREHEN METABOLIC PANEL: CPT | Performed by: EMERGENCY MEDICINE

## 2019-09-18 PROCEDURE — 99285 EMERGENCY DEPT VISIT HI MDM: CPT | Mod: 25 | Performed by: EMERGENCY MEDICINE

## 2019-09-18 PROCEDURE — 85379 FIBRIN DEGRADATION QUANT: CPT | Performed by: EMERGENCY MEDICINE

## 2019-09-18 PROCEDURE — 85025 COMPLETE CBC W/AUTO DIFF WBC: CPT | Performed by: EMERGENCY MEDICINE

## 2019-09-18 ASSESSMENT — ENCOUNTER SYMPTOMS
FEVER: 0
UNEXPECTED WEIGHT CHANGE: 0
VOMITING: 0
APPETITE CHANGE: 0
NAUSEA: 0
CHEST TIGHTNESS: 0
COUGH: 0
ACTIVITY CHANGE: 0
SHORTNESS OF BREATH: 0
CONSTIPATION: 0
ABDOMINAL PAIN: 0
CHILLS: 0

## 2019-09-18 ASSESSMENT — MIFFLIN-ST. JEOR: SCORE: 1786.25

## 2019-09-19 ENCOUNTER — APPOINTMENT (OUTPATIENT)
Dept: CT IMAGING | Facility: CLINIC | Age: 29
End: 2019-09-19
Payer: COMMERCIAL

## 2019-09-19 ENCOUNTER — APPOINTMENT (OUTPATIENT)
Dept: CARDIOLOGY | Facility: CLINIC | Age: 29
End: 2019-09-19
Attending: NURSE PRACTITIONER
Payer: COMMERCIAL

## 2019-09-19 VITALS
BODY MASS INDEX: 25 KG/M2 | RESPIRATION RATE: 16 BRPM | HEART RATE: 58 BPM | TEMPERATURE: 98.4 F | DIASTOLIC BLOOD PRESSURE: 66 MMHG | WEIGHT: 178.57 LBS | SYSTOLIC BLOOD PRESSURE: 106 MMHG | HEIGHT: 71 IN | OXYGEN SATURATION: 96 %

## 2019-09-19 PROBLEM — Q89.3 SITUS INVERSUS TOTALIS: Chronic | Status: ACTIVE | Noted: 2019-08-19

## 2019-09-19 PROBLEM — R07.89 CHEST HEAVINESS: Status: ACTIVE | Noted: 2019-09-19

## 2019-09-19 LAB
INTERPRETATION ECG - MUSE: NORMAL
MAGNESIUM SERPL-MCNC: 2.1 MG/DL (ref 1.6–2.3)
TROPONIN I SERPL-MCNC: <0.015 UG/L (ref 0–0.04)
TROPONIN I SERPL-MCNC: <0.015 UG/L (ref 0–0.04)
TSH SERPL DL<=0.005 MIU/L-ACNC: 1.45 MU/L (ref 0.4–4)

## 2019-09-19 PROCEDURE — G0378 HOSPITAL OBSERVATION PER HR: HCPCS

## 2019-09-19 PROCEDURE — 93320 DOPPLER ECHO COMPLETE: CPT

## 2019-09-19 PROCEDURE — 40000556 ZZH STATISTIC PERIPHERAL IV START W US GUIDANCE

## 2019-09-19 PROCEDURE — 25000128 H RX IP 250 OP 636: Performed by: EMERGENCY MEDICINE

## 2019-09-19 PROCEDURE — 84443 ASSAY THYROID STIM HORMONE: CPT | Performed by: PHYSICIAN ASSISTANT

## 2019-09-19 PROCEDURE — 84484 ASSAY OF TROPONIN QUANT: CPT | Performed by: PHYSICIAN ASSISTANT

## 2019-09-19 PROCEDURE — 71275 CT ANGIOGRAPHY CHEST: CPT

## 2019-09-19 PROCEDURE — 93005 ELECTROCARDIOGRAM TRACING: CPT

## 2019-09-19 PROCEDURE — 25000125 ZZHC RX 250: Performed by: EMERGENCY MEDICINE

## 2019-09-19 PROCEDURE — 83735 ASSAY OF MAGNESIUM: CPT | Performed by: PHYSICIAN ASSISTANT

## 2019-09-19 PROCEDURE — 99212 OFFICE O/P EST SF 10 MIN: CPT | Performed by: INTERNAL MEDICINE

## 2019-09-19 PROCEDURE — 25800030 ZZH RX IP 258 OP 636: Performed by: PHYSICIAN ASSISTANT

## 2019-09-19 PROCEDURE — 93010 ELECTROCARDIOGRAM REPORT: CPT | Performed by: INTERNAL MEDICINE

## 2019-09-19 PROCEDURE — 36415 COLL VENOUS BLD VENIPUNCTURE: CPT | Performed by: PHYSICIAN ASSISTANT

## 2019-09-19 RX ORDER — IOPAMIDOL 755 MG/ML
62 INJECTION, SOLUTION INTRAVASCULAR ONCE
Status: COMPLETED | OUTPATIENT
Start: 2019-09-19 | End: 2019-09-19

## 2019-09-19 RX ORDER — ASPIRIN 81 MG/1
81 TABLET ORAL DAILY
Status: DISCONTINUED | OUTPATIENT
Start: 2019-09-20 | End: 2019-09-19 | Stop reason: HOSPADM

## 2019-09-19 RX ORDER — NALOXONE HYDROCHLORIDE 0.4 MG/ML
.1-.4 INJECTION, SOLUTION INTRAMUSCULAR; INTRAVENOUS; SUBCUTANEOUS
Status: DISCONTINUED | OUTPATIENT
Start: 2019-09-19 | End: 2019-09-19 | Stop reason: HOSPADM

## 2019-09-19 RX ORDER — ACETAMINOPHEN 650 MG/1
650 SUPPOSITORY RECTAL EVERY 4 HOURS PRN
Status: DISCONTINUED | OUTPATIENT
Start: 2019-09-19 | End: 2019-09-19

## 2019-09-19 RX ORDER — SODIUM CHLORIDE 9 MG/ML
INJECTION, SOLUTION INTRAVENOUS CONTINUOUS
Status: DISCONTINUED | OUTPATIENT
Start: 2019-09-19 | End: 2019-09-19 | Stop reason: HOSPADM

## 2019-09-19 RX ORDER — LIDOCAINE 40 MG/G
CREAM TOPICAL
Status: DISCONTINUED | OUTPATIENT
Start: 2019-09-19 | End: 2019-09-19 | Stop reason: HOSPADM

## 2019-09-19 RX ORDER — ALUMINA, MAGNESIA, AND SIMETHICONE 2400; 2400; 240 MG/30ML; MG/30ML; MG/30ML
30 SUSPENSION ORAL EVERY 4 HOURS PRN
Status: DISCONTINUED | OUTPATIENT
Start: 2019-09-19 | End: 2019-09-19 | Stop reason: HOSPADM

## 2019-09-19 RX ORDER — ACETAMINOPHEN 500 MG
1000 TABLET ORAL EVERY 6 HOURS PRN
Status: DISCONTINUED | OUTPATIENT
Start: 2019-09-19 | End: 2019-09-19 | Stop reason: HOSPADM

## 2019-09-19 RX ORDER — ACETAMINOPHEN 325 MG/1
650 TABLET ORAL EVERY 4 HOURS PRN
Status: DISCONTINUED | OUTPATIENT
Start: 2019-09-19 | End: 2019-09-19

## 2019-09-19 RX ORDER — NITROGLYCERIN 0.4 MG/1
0.4 TABLET SUBLINGUAL EVERY 5 MIN PRN
Status: DISCONTINUED | OUTPATIENT
Start: 2019-09-19 | End: 2019-09-19 | Stop reason: HOSPADM

## 2019-09-19 RX ADMIN — SODIUM CHLORIDE: 9 INJECTION, SOLUTION INTRAVENOUS at 06:06

## 2019-09-19 RX ADMIN — SODIUM CHLORIDE: 9 INJECTION, SOLUTION INTRAVENOUS at 13:53

## 2019-09-19 RX ADMIN — SODIUM CHLORIDE, PRESERVATIVE FREE 93 ML: 5 INJECTION INTRAVENOUS at 02:43

## 2019-09-19 RX ADMIN — IOPAMIDOL 62 ML: 755 INJECTION, SOLUTION INTRAVENOUS at 02:43

## 2019-09-19 ASSESSMENT — MIFFLIN-ST. JEOR: SCORE: 1796.25

## 2019-09-19 NOTE — CONSULTS
[facility]   Cardiology Inpatient Consultation    September 19, 2019    Reason for Consult:  A cardiology consult was requested by Jennifer Cary PA-C from the medicine service to provide clinical guidance regarding intermittent chest heaviness in the setting of situs inversus totalis and CCTGA (congenitally corrected transposition of the great arteries).    HPI:   Robert Ge is a 29 year old male with PMH significant for situs inversus totalis and CCTGA whom presented to the emergency department 09/18/2019 with complaints of chest heaviness. The patient states he was lying down in bed at approximately 10pm on Tuesday.  He noted chest pressure located beneath his sternum which did not radiate.  He rates this pain approximately a 4/10.  He denies associated lightheadedness, dizziness, palpitations, shortness of breath, nausea, diaphoresis, and numbness/paresthesias.  He states that this pain lasted approximately 1 minute and resolved on it's own.  He was able to sleep, but throughout Wednesday had episodes similar to the above described (however lasting seconds rather than a full minute) every hour.  The patient states he has had two previous episodes similar to the above described; one in April, at which point in time he was evaluated by cardiology, and one in May.  He notes that he is under a significant amount of stress as compared to his baseline and that this was similarly the case in both April and May. In the last two weeks, the patient denies unexplained nausea/vomiting, fever/chills, sinus congestion, lightheadedness/dizziness, shortness of breath, palpitations, abdominal pain, dysuria, and LE pain/swelling.  The patient went on a 5 mile run Saturday and had no appreciable chest discomfort.    Upon arrival to the emergency department Wednesday evening, his blood pressure was 139/93, he was sating well on RA and his pulse was between 50-80. The patient's labs were  "unremarkable including his TSH and magnesium. The patient's d-dimer was 0.9; a CT PE was obtained which showed no appreciable PE and known situs inversus with dextrocardia. The patient's EKG shows no acute ST segment changes with T-wave inversion of aVL only; this is unchanged from the patient's previous EKGs.  The patient had 3 negative troponin's.  At present, the patient continues to have intermittent chest pressure approximately once per hour.  He states he is otherwise feeling \"fine\". The patient has had no significant events on telemetry; specifically, he has had no PSVT.    The patient's cardiac history is significant for situs inversus totalis with dextrocardia, moderate RV enlargement with mild decrease of RV function, mild AV regurgitation of the right side of his heart, and hypertriglyceridemia.  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 He recently established care with Dr. GABRIEL Almaraz of the Congenital Cardiology team 05/2019 due to the episode of chest palpitations/chest pain in April of 2019. He wore a ZioPatch monitor x14 days; this showed 3 supraventricular tachycardia runs, the longest and fastest run spanning 16.6 seconds with a maximum heart rate of 133 bpm. One of the patient's symptomatic episodes of chest pain/discomfort corresponded to the above described NSVT.  The patient did, however, have several \"symptomatic events\" which correlated with sinus rhythm. In addition, he underwent cardiac MRI which showed L-TGA with situs inversus, mild LV/RV dysfunction, mild AI and mild PI. In addition, he recently underwent cardiopulmonary stress testing 09/06/2019; he exhibited no symptoms of pain/palpitations/SOB during the test.  The patient's EKG was non-diagnostic in regards to ischemia. The patient's exercised for a total 14:43; the test was terminated secondary to leg fatigue.    Review of Systems:    Complete review " of systems was performed and negative except per HPI.      PMH:  Pertinent medical history as described above.    Active Problems:  Patient Active Problem List    Diagnosis Date Noted     Situs inversus totalis 2019     Priority: Medium     Patient is followed by the Adult [facility] 2019     Priority: Medium     For urgent after hours needs, please call [phone] and ask to speak with the Adult Congenital Heart Disease Physician on call - mention job code [id number].         Social History:  Social History     Tobacco Use     Smoking status: Former Smoker     Packs/day: 0.50     Years: 10.00     Pack years: 5.00     Last attempt to quit: 2014     Years since quittin.0     Smokeless tobacco: Never Used   Substance Use Topics     Alcohol use: Yes     Comment: 2-3 beers daily     Drug use: Never     Family History:  History reviewed. No pertinent family history.    Medications:    [START ON 2019] aspirin  81 mg Oral Daily     sodium chloride (PF)  3 mL Intracatheter Q8H       sodium chloride 125 mL/hr at 19 0606     Physical Exam:  Temp:  [97.9  F (36.6  C)-98.6  F (37  C)] 97.9  F (36.6  C)  Pulse:  [47-66] 58  Heart Rate:  [54-75] 60  Resp:  [11-18] 16  BP: (106-139)/(60-93) 113/60  SpO2:  [95 %-100 %] 97 %     GEN: Pleasant, no acute distress  HEENT: No evidence of cranial trauma.  CV: Heart tones auscultated at the right sternal border and the right midclavicular line, in the 5th intercostal space. Heart sounds were regular in rate and rhythm. Seemingly normal s1/s2.  I could not appreciable discernable murmur, rub, no gallop.  The patient's JVP was not elevated. Of note, patient had episode of chest pressure during physical exam; there were no changes to his heart tones during said episode.  CHEST: clear to ausculation bilaterally, no rales or wheezing  ABD: soft, non-tender, normal active bowel sounds  EXTR: PT pulses 3+ bilaterally.  No appreciable  lower extremity edema  NEURO: alert oriented, speech fluent/appropriate, motor grossly nonfocal    Diagnostics:  Lab Results   Component Value Date    TROPI <0.015 09/19/2019    TROPI <0.015 09/19/2019    TROPI <0.015 09/18/2019     EKG 09/19/2019:      EKG 05/14/2019:        Assessment & Plan   Robert Ge is a 29 year old male with PMH significant for situs inversus totalis and CCTGA whom presented to the emergency department 09/18/2019 with complaints of chest heaviness.     Chest Pressure  Troponin negative x3.  EKG unchanged from baseline.  Recent cardiopulmonary stress testing is within normal limits.    DDx: very low likelihood of ACS with this presentation.  Possible secondary to ?stress  - order congential ECHO; if normal, no other intervention needed prior to discharge  - outpatient cardiology follow up within 1 month      I have discussed the above with Dr. GABRIEL Almaraz and Dr. Hallie Dougherty.    Thank you for consulting the cardiovascular services at the [facility]. Please do not hesitate to call us with any questions.     Nancy Matthews PA-C  [facility] Cardiology Consult Team

## 2019-09-19 NOTE — PROGRESS NOTES
PRIOR TO DISCHARGE     Comments: List all goals to be met before discharge home:      - Serial troponins and stress test complete: no  - Seen and cleared by consultant if applicable: no   - Adequate pain control on oral analgesia: yes, declines pain medication  - Vital signs normal or at patient baseline: no, pt bradycardic. Pt cannot remember if he is typically bradycardic.   - Safe disposition plan has been identified: no  - Nurse to notify provider when observation goals have been met and patient is ready for discharge.    Pt admitted from ED. C/o intermittent non-radiating chest pain but declines intervention as it resolves quickly. Tele monitor in place, bradycardic in the 40s-50s, provider notified, EKG completed. CT completed. NPO with NS at 125 ml/hr to PIV. Independent in room, continue to monitor.

## 2019-09-19 NOTE — PLAN OF CARE
DISCHARGE                         No discharge date for patient encounter.  ----------------------------------------------------------------------------  Discharged to: Home  Via: private transportation  Accompanied by: significant other  Discharge Instructions: regular diet, activity as tolerated, medications, follow up appointments, when to call the MD, aftercare instructions.  Prescriptions: continue Metoprolol  Follow Up Appointments: cardiologist in one month  Belongings: All sent with pt  IV: d/c'd  Telemetry: d/c'd  Pt exhibits understanding of above discharge instructions; all questions answered.    Discharge Paperwork: Signed, copied, and sent home with patient.

## 2019-09-19 NOTE — ED PROVIDER NOTES
"    [place] EMERGENCY DEPARTMENT ([facility])  9/18/19   History     Chief Complaint   Patient presents with     Chest Pain     The history is provided by the patient and medical records.     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  Patient reports that starting in April he had a strong brief episode (2 minutes) episode of midsternal chest pain that woke him up with associated dizziness and lightheadedness.  He states that he had another less severe episode in May without accompanying dizziness and lightheadedness which prompted him to see a cardiologist (Dr. Kacy Almaraz) that same month and had a full work-up including a stress test.  Patient states that his chest pain episodes went away but returned yesterday.  He describes his chest pain as \"acute pain in his heart\" and points to the center of his chest.  He has had 4 episodes so far lasting approximately 1 minute.  He states that these episodes seem to be triggered by lying in bed at night and are not exertional.  Chest pain does not radiate.  He denies associated cough, shortness of breath, nausea or vomiting.  He endorses possible palpitation associated with this, denies sensation of a racing heart.  He denies fevers, chills, rhinorrhea or other recent illness.  He states that he has had some stress recently.  He denies leg swelling and has not had any recent prolonged periods of immobilization.  He states that he flew to [place] approximately a month ago, other recent travel.  Pain is not pleuritic in nature.  He denies history of PE or DVT and denies prior cardiac episodes in himself or family members.  Patient states that he quit smoking approximately 5 years ago.  He denies any recent surgeries or falls but does report " history of hypercholesterolemia, dextrocardia, insitus inversus totalis, and tricuspid regurgitation.  The patient states that he has been on beta-blockers since last Friday (25 mg metoprolol for 1.5 weeks). 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     I have reviewed the Medications, Allergies, Past Medical and Surgical History, and Social History in the Epic system.    History reviewed. No pertinent past medical history.    History reviewed. No pertinent surgical history.    History reviewed. No pertinent family history.    Social History     Tobacco Use     Smoking status: Former Smoker     Packs/day: 0.50     Years: 10.00     Pack years: 5.00     Last attempt to quit: 2014     Years since quittin.0     Smokeless tobacco: Never Used   Substance Use Topics     Alcohol use: Yes     Comment: 2-3 beers daily       Current Facility-Administered Medications   Medication     acetaminophen (TYLENOL) Suppository 650 mg     acetaminophen (TYLENOL) tablet 650 mg     alum & mag hydroxide-simethicone (MYLANTA ES/MAALOX  ES) suspension 30 mL     [START ON 2019] aspirin EC tablet 81 mg     lidocaine (LMX4) cream     lidocaine 1 % 0.1-1 mL     naloxone (NARCAN) injection 0.1-0.4 mg     nitroGLYcerin (NITROSTAT) sublingual tablet 0.4 mg     sodium chloride (PF) 0.9% PF flush 3 mL     sodium chloride (PF) 0.9% PF flush 3 mL      No Known Allergies     Review of Systems   Constitutional: Negative for activity change, appetite change, chills, fever and unexpected weight change.   Respiratory: Negative for cough, chest tightness and shortness of breath.    Cardiovascular: Positive for chest pain (intermittant midsternal wo radiation). Negative for leg swelling.   Gastrointestinal: Negative for abdominal pain, constipation, nausea and vomiting.   All other systems reviewed and are negative.      Physical Exam   BP: (!) 139/93  Pulse: (!) 47  Heart Rate: 59  Temp: 98.6  F (37  C)  Resp:  "16  Height: 181 cm (5' 11.26\")  Weight: 80 kg (176 lb 5.9 oz)  SpO2: 97 %      Physical Exam   General: awake, alert, NAD  Head: normal cephalic  HEENT: pupils equal, conjugate gaze in tact  Neck: Supple  CV: regular rate and rhythm without murmur  Lungs: clear to ascultation  Abd: soft, non-tender, no guarding, no peritoneal signs  EXT: lower extremities without swelling or edema  Neuro: awake, answers questions appropriately. No focal deficits noted        ED Course   9:50 PM  The patient was seen and examined by Juan Daniel Corea MD in Room ED03.      Medications   lidocaine 1 % 0.1-1 mL (has no administration in time range)   lidocaine (LMX4) cream (has no administration in time range)   sodium chloride (PF) 0.9% PF flush 3 mL (has no administration in time range)   sodium chloride (PF) 0.9% PF flush 3 mL (has no administration in time range)   aspirin EC tablet 81 mg (has no administration in time range)   nitroGLYcerin (NITROSTAT) sublingual tablet 0.4 mg (has no administration in time range)   alum & mag hydroxide-simethicone (MYLANTA ES/MAALOX  ES) suspension 30 mL (has no administration in time range)   acetaminophen (TYLENOL) tablet 650 mg (has no administration in time range)   acetaminophen (TYLENOL) Suppository 650 mg (has no administration in time range)   naloxone (NARCAN) injection 0.1-0.4 mg (has no administration in time range)        Results for orders placed or performed during the hospital encounter of 09/18/19 (from the past 24 hour(s))   EKG 12-lead, tracing only   Result Value Ref Range    Interpretation ECG Click View Image link to view waveform and result    CBC with platelets differential   Result Value Ref Range    WBC 7.3 4.0 - 11.0 10e9/L    RBC Count 5.56 4.4 - 5.9 10e12/L    Hemoglobin 16.5 13.3 - 17.7 g/dL    Hematocrit 49.4 40.0 - 53.0 %    MCV 89 78 - 100 fl    MCH 29.7 26.5 - 33.0 pg    MCHC 33.4 31.5 - 36.5 g/dL    RDW 12.0 10.0 - 15.0 %    Platelet Count 242 150 - 450 10e9/L    Diff " Method Automated Method     % Neutrophils 49.5 %    % Lymphocytes 37.2 %    % Monocytes 8.0 %    % Eosinophils 4.0 %    % Basophils 1.0 %    % Immature Granulocytes 0.3 %    Nucleated RBCs 0 0 /100    Absolute Neutrophil 3.6 1.6 - 8.3 10e9/L    Absolute Lymphocytes 2.7 0.8 - 5.3 10e9/L    Absolute Monocytes 0.6 0.0 - 1.3 10e9/L    Absolute Eosinophils 0.3 0.0 - 0.7 10e9/L    Absolute Basophils 0.1 0.0 - 0.2 10e9/L    Abs Immature Granulocytes 0.0 0 - 0.4 10e9/L    Absolute Nucleated RBC 0.0    Comprehensive metabolic panel   Result Value Ref Range    Sodium 142 133 - 144 mmol/L    Potassium 3.8 3.4 - 5.3 mmol/L    Chloride 107 94 - 109 mmol/L    Carbon Dioxide 29 20 - 32 mmol/L    Anion Gap 5 3 - 14 mmol/L    Glucose 84 70 - 99 mg/dL    Urea Nitrogen 12 7 - 30 mg/dL    Creatinine 1.10 0.66 - 1.25 mg/dL    GFR Estimate 89 >60 mL/min/[1.73_m2]    GFR Estimate If Black >90 >60 mL/min/[1.73_m2]    Calcium 9.1 8.5 - 10.1 mg/dL    Bilirubin Total 0.4 0.2 - 1.3 mg/dL    Albumin 4.2 3.4 - 5.0 g/dL    Protein Total 8.3 6.8 - 8.8 g/dL    Alkaline Phosphatase 63 40 - 150 U/L    ALT 37 0 - 70 U/L    AST 22 0 - 45 U/L   Troponin I   Result Value Ref Range    Troponin I ES <0.015 0.000 - 0.045 ug/L   D dimer quantitative   Result Value Ref Range    D Dimer 0.9 (H) 0.0 - 0.50 ug/ml FEU   XR Chest 2 Views    Narrative    XR CHEST 2 VW  9/18/2019 10:28 PM      HISTORY: chest pain    COMPARISON: MRI 9/6/2018    FINDINGS: Findings of situs inversus. No pleural effusion or  pneumothorax. No focal airspace opacity. No displaced rib fracture.      Impression    IMPRESSION: No acute cardiopulmonary process. Situs inversus.          Procedures             EKG Interpretation:      Interpreted by Juan Daniel Corea MD  Time reviewed: 2140  Symptoms at time of EKG: Chest pain  Rhythm: Sinus bradycardia  Rate: 55  Axis: normal  Ectopy: none  Conduction: normal  ST Segments/ T Waves: No ST-T wave changes  Q Waves: none  Comparison to prior: Unchanged  from previous    Clinical Impression: Sinus bradycardia, unchanged from previous EKG without signs of acute ischemia           Assessments & Plan (with Medical Decision Making)   Robert is a 29-year-old male who presents with chest pain.  Patient symptoms do not sound particularly concerning for ACS however patient does have an anatomically abnormal heart, per chart review patient has normal stress test at the beginning of this month which is reassuring against ACS however he was noted to have enlarged atria bilaterally along with AV conduction abnormality and cardiac MRI.    Differential would include ACS though I think less likely, cardiac arrhythmia, chest wall pain, or PE given the history of recent travel to [place].  Initial evaluation will include EKG, laboratory studies, and chest x-ray.  Patient was placed on cardiac monitoring while in the emergency department.    Patient has a normal white count, no left shift.  Normal CMP.  Negative troponin.  Patient's chest x-ray normal aside from the documented situs inversus.  Patient did have an elevated d-dimer, CT PE study was will be obtained in ED obs.    I am reassured that he had a normal stress test, lower suspicion for ACS however given his underlying cardiac abnormalities would like to obtain a cardiology consult for the patient prior to discharge.  Plan is to admit to the ED observation unit for cardiac monitoring, serial tropes, and cardiology consultation.  Patient remained vitally stable throughout the ER course.    I have reviewed the nursing notes.    I have reviewed the findings, diagnosis, plan and need for follow up with the patient.    Current Discharge Medication List          Final diagnoses:   Chest pain, unspecified type     LATISHA, Villa Baptiste, am serving as a trained medical scribe to document services personally performed by Juan Daniel Corea MD, based on the provider's statements to me.   I, Juan Daniel Corea MD, was physically present and have reviewed  and verified the accuracy of this note documented by Villa Baptiste.     9/18/2019   [facility], [place], EMERGENCY DEPARTMENT     Juan Daniel Corea MD  09/19/19 0124

## 2019-09-19 NOTE — ED TRIAGE NOTES
Pt. Presents to ED with complaints of chest pain that is intermittent and lasting only about a minute in time. Pt. Denies other accompanying symptoms and reports that position does not change the pain. Hx of dextrocardia, insitus inversus, and tricuspid regurgitation. AVSS on RA, independent.

## 2019-09-19 NOTE — PLAN OF CARE
PRIOR TO DISCHARGE     Comments: List all goals to be met before discharge home:       - Serial troponins and stress test complete: yes  - Seen and cleared by consultant if applicable: no, cardiology needs to see pt  - Adequate pain control on oral analgesia: yes, denies pain.  - Vital signs normal or at patient baseline: yes  - Safe disposition plan has been identified: no  - Nurse to notify provider when observation goals have been met and patient is ready for discharge.

## 2019-09-19 NOTE — H&P
[facility], [place]    History and Physical - ED Observation Service       Date of Admission:  9/18/2019    Assessment & Plan   Robert Ge is a 29 year old male admitted on 9/18/2019. 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     1. Chest Pain:   Intermittent brief episodes of midsternal nonradiating chest pressure; associated dizziness and lightheadedness on first episode in April; another episode in May without associated symptoms; more frequent and persistent symptoms x1 day without associated symptoms; onset typically while laying down but occasional episodes with activity.  Reports incredible amount of stress recently.  Recent travel back from [place] on August 26th. Denies leg swelling, SOB, previous PE or DVT, trauma, family history of CAD, heartburn, reflux, gassy, bloated, cough, fevers, chills, rhinorrhea, increased caffeine intake.  History of HLD.  Daily EtOH use ~2-3 beers.  5-pack-year history of tobacco use.  Denies illicit drug use.  Was seen by his cardiologist (Dr. Kacy Almaraz) in May 2019 and had a full work-up including an Echocardiogram and Holter monitor. Ziopatch demonstrated episodes of NS SVT, longest was just 16 beats and symptomatic with 1 event that he awoke from sleep with and was SVT. Seen for a follow up by his Cardiologist again 9/6/19 at which time he had a cardiopulmonary stress test which demonstrated very good functional capacity. 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 He was started on low dose beta blocker with metoprolol 25mg daily based on his previous Ziopatch results. Per Dr. Almaraz's plan, if patient  should have more significant events in the future,  ablation would be considered. In ED, HR 47-75, -139/67-93, RR 12-18, SaO2  % on RA, Temp 98.6. Labs show normal CMP, CBC.  D-dimer positive at 0.9.  Troponin I negative x1.  EKG reports sinus bradycardia, VR 55, Q wave in V6, T wave inversion in V1, no acute signs of ischemia; previous EKG on May 14, 2019 without Q waves in V6.  Chest x-ray negative. Risk Factors include HLD, history of tobacco use. DDX: r/o PE, MSK, arrhythmia, r/o ACS  - Serial troponins q4h x 2 more  - Continuous telemetry  - Repeat EKG in AM  - Cardiology consult in a.m.  - Nitro PRN  - ASA 81mg daily  - Clear liquid diet and n.p.o. at 4 AM  - Will hold metoprolol for now  - CT Chest PE protocol     Diet: Clear liquid diet; npo at 4am  DVT Prophylaxis: Low Risk/Ambulatory with no VTE prophylaxis indicated  Jo Catheter: not present  Code Status: Full Code      Disposition Plan   Expected discharge: Today, recommended to prior living arrangement once ACS work-up negative.  Entered: GLORIA Lund 09/19/2019, 12:58 AM     The patient's care was discussed with the Attending Physician, Dr. Salinas.    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  Patient reports that starting in April he had a strong brief episode of midsternal chest pain that woke him up from sleep with associated dizziness and lightheadedness. Pain was described as a strong non radiating sternal pressure lasting less than 2  "minutes. He reports another less severe episode in May without accompanying dizziness and lightheadedness which prompted him to see a cardiologist (Dr. Kacy Almaraz) that same month and had a full work-up including an Echocardiogram and Holter monitor. His Ziopatch demonstrated episodes of NS SVT, longest was just 16 beats and symptomatic with 1 event that he awoke from sleep with and was SVT. He was seen for a follow up by his Cardiologist again on 9/6/19 at which time he had a cardiopulmonary stress test which demonstrated very good functional capacity. 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 He was started on low dose beta blocker with metoprolol 25mg daily based on his previous Ziopatch results. Per Dr. Almaraz's plan, if patient  should have more significant events in the future, ablation would be considered.     Patient reports chest pain episodes went away but returned again yesterday.  He describes his chest pain as \"acute pain in his heart\" and points to the center of his chest.  So far he has had 7-8 episodes of sternal non radiating chest pressure, no associated symptoms. This time it first came on again while he was laying down \"half asleep\" and most of the other times have been when he is laying down.     He admits to an incredible amount of stress recently. He was in [place] from May and returned around August 26th.  He denies leg swelling, SOB, prolonged periods of immobilization, history of PE or DVT, trauma, prior cardiac episodes in himself or family members, heartburn, reflux, gassy, bloated, cough, fevers, chills, rhinorrhea, other recent illness, increased caffeine intake. Quit smoking approximately 5 years ago; 5 pack year history.  He denies any recent surgeries or falls but does report history of hypercholesterolemia, dextrocardia, insitus inversus totalis, and tricuspid " regurgitation.      In the ED, HR 47-75, -139/67-93, RR 12-18, SaO2  % on RA, Temp 98.6. Labs show normal CMP, CBC.  D-dimer positive at 0.9.  Troponin I negative x1.  EKG reports sinus bradycardia, VR 55, Q wave in V6, T wave inversion in V1, no acute signs of ischemia; previous EKG on May 14, 2019 without Q waves in V6.  Chest x-ray negative.  Patient is being admitted to the ED observation unit for ACS rule out.    Echocardiogram (5/10/19):  Technically difficult study due to poor acoustic windows. The cardiac apex is midline. The morphologic right ventricle is to the right of the morphologic left ventricle. The right sided AV valve has mild to moderate regurgitation. The right ventricle appears to be moderately enlarged with low normal systolic function. The aorta possibly arises from the right sided ventricle. The origin of the pulmonary artery cannot be well seen. There is trivial regurgitation of the left sided AV valve with low velocity pressure estimate. The patient is fully saturated at 98%.    Ziopatch (5/14/19):  Occasional runs of non sustained SVT, longest was just 16 beats. Symptomatic episodes corresponded once to the short run of SVT and corresponded to sinus rhythm all other times.    MRA/MRI Cardiac (9/6/19):  L-TGA with situs inversus, mild LV and mild RV dysfunction. Mild AI and mild PI.    Cardiopulmonary Stress Echo (9/6/19):  Stress ECG negative for ischemia at diagnostic level of myocardial O2 demand.  Specificity of the ECG changes was reduced secondary to baseline ST-T wave changes which is consistent with early repolarization pattern.    Review of Systems    All other ROS negative except those mentioned in above note.     Past Medical History    I have reviewed this patient's medical history and updated it with pertinent information if needed.   History reviewed. No pertinent past medical history.    Past Surgical History   I have reviewed this patient's surgical history and  updated it with pertinent information if needed.  History reviewed. No pertinent surgical history.    Social History   I have reviewed this patient's social history and updated it with pertinent information if needed.  Social History     Tobacco Use     Smoking status: Former Smoker     Packs/day: 0.50     Years: 10.00     Pack years: 5.00     Last attempt to quit: 2014     Years since quittin.0     Smokeless tobacco: Never Used   Substance Use Topics     Alcohol use: Yes     Comment: 2-3 beers daily     Drug use: Never       Family History   I have reviewed this patient's family history and updated it with pertinent information if needed.   History reviewed. No pertinent family history.    Prior to Admission Medications   Prior to Admission Medications   Prescriptions Last Dose Informant Patient Reported? Taking?   metoprolol succinate ER (TOPROL XL) 25 MG 24 hr tablet   No No   Sig: Take 1 tablet (25 mg) by mouth daily      Facility-Administered Medications: None     Allergies   No Known Allergies    Physical Exam   Vital Signs: Temp: 98.4  F (36.9  C) Temp src: Oral BP: 124/84 Pulse: 58 Heart Rate: 56 Resp: 18 SpO2: 97 % O2 Device: None (Room air)    Weight: 178 lbs 9.16 oz    Constitutional: awake, alert, cooperative, no apparent distress, and appears stated age  Eyes: Lids and lashes normal, pupils equal, round and reactive to light, extra ocular muscles intact, sclera clear, conjunctiva normal  ENT: Normocephalic, without obvious abnormality, atraumatic, sinuses nontender on palpation, external ears without lesions, oral pharynx with moist mucous membranes, tonsils without erythema or exudates, gums normal and good dentition.  Hematologic / Lymphatic: no cervical lymphadenopathy  Respiratory: No increased work of breathing, good air exchange, clear to auscultation bilaterally, no crackles or wheezing  Cardiovascular: Normal apical impulse, regular rate and rhythm, normal S1 and S2, no S3 or S4, and no  murmur noted.  No chest wall tenderness.  GI: No scars, normal bowel sounds, soft, non-distended, non-tender, no masses palpated, no hepatosplenomegally  Skin: no bruising or bleeding  Musculoskeletal: There is no redness, warmth, or swelling of the joints.  Full range of motion noted.  Motor strength is 5 out of 5 all extremities bilaterally.  Tone is normal.  Neurologic: Awake, alert, oriented to name, place and time.  Cranial nerves II-XII are grossly intact.  Motor is 5 out of 5 bilaterally.  Cerebellar finger to nose, heel to shin intact.  Sensory is intact.  Babinski down going, Romberg negative, and gait is normal.  Neuropsychiatric: General: normal, calm and normal eye contact    Data   Data reviewed today: I reviewed all medications, new labs and imaging results over the last 24 hours.  Recent Labs   Lab 09/18/19 2201   WBC 7.3   HGB 16.5   MCV 89         POTASSIUM 3.8   CHLORIDE 107   CO2 29   BUN 12   CR 1.10   ANIONGAP 5   CAIT 9.1   GLC 84   ALBUMIN 4.2   PROTTOTAL 8.3   BILITOTAL 0.4   ALKPHOS 63   ALT 37   AST 22   TROPI <0.015     Most Recent 3 CBC's:  Recent Labs   Lab Test 09/18/19  2201 05/10/19  1223   WBC 7.3 7.8   HGB 16.5 16.3   MCV 89 88    246     Most Recent 3 BMP's:  Recent Labs   Lab Test 09/18/19  2201 05/10/19  1223    138   POTASSIUM 3.8 4.2   CHLORIDE 107 107   CO2 29 23   BUN 12 14   CR 1.10 0.88   ANIONGAP 5 8   CAIT 9.1 9.5   GLC 84 91     Most Recent 2 LFT's:  Recent Labs   Lab Test 09/18/19  2201 05/10/19  1223   AST 22 34   ALT 37 38   ALKPHOS 63 65   BILITOTAL 0.4 0.6     Most Recent 3 INR's:No lab results found.  Most Recent 3 Troponin's:  Recent Labs   Lab Test 09/18/19 2201   TROPI <0.015     Most Recent D-dimer:  Recent Labs   Lab Test 09/18/19 2201   DD 0.9*     Most Recent Cholesterol Panel:  Recent Labs   Lab Test 05/10/19  1223   CHOL 205*   LDL 93   HDL 45   TRIG 331*     Most Recent TSH and T4:  Recent Labs   Lab Test 05/10/19  1223   TSH  1.73     Recent Results (from the past 24 hour(s))   XR Chest 2 Views    Narrative    XR CHEST 2 VW  9/18/2019 10:28 PM      HISTORY: chest pain    COMPARISON: MRI 9/6/2018    FINDINGS: Findings of situs inversus. No pleural effusion or  pneumothorax. No focal airspace opacity. No displaced rib fracture.      Impression    IMPRESSION: No acute cardiopulmonary process. Situs inversus.

## 2019-09-19 NOTE — DISCHARGE SUMMARY
Discharge Summary    Robert Ge MRN# [id number]   YOB: 1990 Age: 29 year old     Date of Admission:  9/18/2019  Date of Discharge:  9/19/2019  Admitting Physician:  Dimitris Salinas MD  Discharge Physician:  Basil Larose MD   Discharging Service:  Emergency Medicine     Primary Provider: Irene Nielsen          Discharge Diagnosis:     Chest heaviness    * No resolved hospital problems. *               Discharge Disposition:   Discharged to home           Condition on Discharge:   Discharge condition: Stable   Code status on discharge: Full Code           Procedures:   No procedures performed during this admission          Discharge Medications:     Current Discharge Medication List      CONTINUE these medications which have NOT CHANGED    Details   metoprolol succinate ER (TOPROL XL) 25 MG 24 hr tablet Take 1 tablet (25 mg) by mouth daily  Qty: 90 tablet, Refills: 3    Associated Diagnoses: Congenital heart anomaly                   Consultations:   Consultation during this admission received from cardiology             Brief History of Illness:   Please see detailed H&P from 9/19/19, in brief: Robert Ge is a 29 year old male admitted on 9/18/2019. 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           Hospital Course:   1. Chest Pain:   Intermittent brief episodes of midsternal nonradiating chest pressure; associated dizziness and lightheadedness on first episode in April; another episode in May without associated symptoms; more frequent and persistent symptoms x1 day without associated symptoms; onset typically while laying down but occasional episodes with activity.  Reports incredible amount of stress recently.  Recent travel back from [place] on August 26th. Denies leg swelling,  SOB, previous PE or DVT, trauma, family history of CAD, heartburn, reflux, gassy, bloated, cough, fevers, chills, rhinorrhea, increased caffeine intake.  History of HLD.  Daily EtOH use ~2-3 beers.  5-pack-year history of tobacco use.  Denies illicit drug use.  Was seen by his cardiologist (Dr. Kacy Almaraz) in May 2019 and had a full work-up including an Echocardiogram and Holter monitor. Ziopatch demonstrated episodes of NS SVT, longest was just 16 beats and symptomatic with 1 event that he awoke from sleep with and was SVT. Seen for a follow up by his Cardiologist again 9/6/19 at which time he had a cardiopulmonary stress test which demonstrated very good functional capacity. 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 He was started on low dose beta blocker with metoprolol 25mg daily based on his previous Ziopatch results. Per Dr. Almaraz's plan, if patient  should have more significant events in the future, ablation would be considered. In ED, HR 47-75, -139/67-93, RR 12-18, SaO2  % on RA, Temp 98.6. Labs show normal CMP, CBC.  D-dimer positive at 0.9.  Troponin I negative x1.  EKG reports sinus bradycardia, VR 55, Q wave in V6, T wave inversion in V1, no acute signs of ischemia; previous EKG on May 14, 2019 without Q waves in V6.  Chest x-ray negative. Risk Factors include HLD, history of tobacco use. DDX: r/o PE, MSK, arrhythmia, r/o ACS.  While in the observation unit the patient's vital signs remained stable, afebrile.  Serial troponins negative X 3, no ectopy on telemetry.  Chest CT negative for PE.  Cardiology was consulted and congenital echo was done- this was unchanged from cardiac MRI images.  Cardiology recommending discharge to home and follow up with Dr. Almaraz (cardiology) in one month.  Return to the ER if he has new or worsening chest pain, shortness of breath, jaw or arm pain, or  "fainting.              Final Day of Progress before Discharge:       Physical Exam:  Blood pressure 106/66, pulse 58, temperature 98.4  F (36.9  C), temperature source Oral, resp. rate 16, height 1.81 m (5' 11.26\"), weight 81 kg (178 lb 9.2 oz), SpO2 96 %.    EXAM:  Exam:  Constitutional: healthy, alert and no distress  Cardiovascular: No lifts, heaves, or thrills. RRR. No murmurs, clicks gallops or rub  Respiratory: Good diaphragmatic excursion. Lungs clear  Gastrointestinal: Abdomen soft, non-tender. BS normal. No masses, organomegaly  Musculoskeletal: extremities normal- no gross deformities noted, gait normal and normal muscle tone  Skin: no suspicious lesions or rashes  Neurologic: Gait normal. Alert and oriented.   Psychiatric: mentation appears normal and affect normal/bright    /66 (BP Location: Right arm)   Pulse 58   Temp 98.4  F (36.9  C) (Oral)   Resp 16   Ht 1.81 m (5' 11.26\")   Wt 81 kg (178 lb 9.2 oz)   SpO2 96%   BMI 24.72 kg/m               Data:  All laboratory data reviewed             Significant Results:     Results for orders placed or performed during the hospital encounter of 09/18/19   XR Chest 2 Views    Narrative    XR CHEST 2 VW  9/18/2019 10:28 PM      HISTORY: chest pain    COMPARISON: MRI 9/6/2018    FINDINGS: Findings of complete situs inversus. No pleural effusion or  pneumothorax. No focal airspace opacity. No displaced rib fracture.      Impression    IMPRESSION: No acute cardiopulmonary process. Complete Situs inversus.    I have personally reviewed the examination and initial interpretation  and I agree with the findings.    JOHN VILLAREAL MD   CT Chest Pulmonary Embolism w Contrast    Narrative    Exam: Chest CT Angiogram with 3-dimensional reconstruction  9/19/2019.    Comparison: Radiograph yesterday     Clinical History: :PE suspected, intermediate prob, positive D-dimer;  positive d-dimer. Returned from [place] on 8/26 with persistent sternal  chest " pain.    Technique: Volumetric helical acquisition of the chest were obtained  following pulmonary embolism protocol from the lung apices through the  upper abdomen. The pulmonary arteries are well-opacified to evaluate  for pulmonary embolism.    3-dimensional post processed angiographic images were reconstructed,  archived to PACS and used in the interpretation of this study.    Findings:  Mediastinum/hilum: Situs inversus with dextrocardia. Right-sided  aortic arch with mirror image branching. The heart size is normal. The  great vessels are normal in caliber. There is no evidence of a filling  defect in the pulmonary arteries to suggest a pulmonary embolism. No  evidence of axillary, mediastinal or hilar lymphadenopathy by size  criteria. No pericardial effusion is noted. Narrowing of the right  subclavian vein with multiple collaterals in the right arm.    Lungs/pleura: The visualized portion of the lungs are clear. No  evidence of pleural effusion is noted.    Upper Abdomen: Unremarkable appearance of the upper abdomen.    Bones/Soft tissues: No suspicious bony lesions.      Impression    Impression:   1. No pulmonary embolism or other acute abnormality in the chest.  2. Situs inversus with dextrocardia and right-sided aortic arch with  mirror image branching, surgically corrected transposition of the  great vessels.         I have personally reviewed the examination and initial interpretation  and I agree with the findings.    JOHN VILLAREAL MD   CBC with platelets differential   Result Value Ref Range    WBC 7.3 4.0 - 11.0 10e9/L    RBC Count 5.56 4.4 - 5.9 10e12/L    Hemoglobin 16.5 13.3 - 17.7 g/dL    Hematocrit 49.4 40.0 - 53.0 %    MCV 89 78 - 100 fl    MCH 29.7 26.5 - 33.0 pg    MCHC 33.4 31.5 - 36.5 g/dL    RDW 12.0 10.0 - 15.0 %    Platelet Count 242 150 - 450 10e9/L    Diff Method Automated Method     % Neutrophils 49.5 %    % Lymphocytes 37.2 %    % Monocytes 8.0 %    % Eosinophils 4.0 %    %  Basophils 1.0 %    % Immature Granulocytes 0.3 %    Nucleated RBCs 0 0 /100    Absolute Neutrophil 3.6 1.6 - 8.3 10e9/L    Absolute Lymphocytes 2.7 0.8 - 5.3 10e9/L    Absolute Monocytes 0.6 0.0 - 1.3 10e9/L    Absolute Eosinophils 0.3 0.0 - 0.7 10e9/L    Absolute Basophils 0.1 0.0 - 0.2 10e9/L    Abs Immature Granulocytes 0.0 0 - 0.4 10e9/L    Absolute Nucleated RBC 0.0    Comprehensive metabolic panel   Result Value Ref Range    Sodium 142 133 - 144 mmol/L    Potassium 3.8 3.4 - 5.3 mmol/L    Chloride 107 94 - 109 mmol/L    Carbon Dioxide 29 20 - 32 mmol/L    Anion Gap 5 3 - 14 mmol/L    Glucose 84 70 - 99 mg/dL    Urea Nitrogen 12 7 - 30 mg/dL    Creatinine 1.10 0.66 - 1.25 mg/dL    GFR Estimate 89 >60 mL/min/[1.73_m2]    GFR Estimate If Black >90 >60 mL/min/[1.73_m2]    Calcium 9.1 8.5 - 10.1 mg/dL    Bilirubin Total 0.4 0.2 - 1.3 mg/dL    Albumin 4.2 3.4 - 5.0 g/dL    Protein Total 8.3 6.8 - 8.8 g/dL    Alkaline Phosphatase 63 40 - 150 U/L    ALT 37 0 - 70 U/L    AST 22 0 - 45 U/L   Troponin I   Result Value Ref Range    Troponin I ES <0.015 0.000 - 0.045 ug/L   D dimer quantitative   Result Value Ref Range    D Dimer 0.9 (H) 0.0 - 0.50 ug/ml FEU   Troponin I - Now then in 4 hours x 2   Result Value Ref Range    Troponin I ES <0.015 0.000 - 0.045 ug/L   Troponin I - Now then in 4 hours x 2   Result Value Ref Range    Troponin I ES <0.015 0.000 - 0.045 ug/L   Magnesium   Result Value Ref Range    Magnesium 2.1 1.6 - 2.3 mg/dL   TSH with free T4 reflex   Result Value Ref Range    TSH 1.45 0.40 - 4.00 mU/L   EKG 12-lead, tracing only   Result Value Ref Range    Interpretation ECG Click View Image link to view waveform and result    EKG 12-lead, complete   Result Value Ref Range    Interpretation ECG Click View Image link to view waveform and result    EKG 12-lead, tracing only   Result Value Ref Range    Interpretation ECG Click View Image link to view waveform and result    Cardiology General Adult IP Consult:  Patient to be seen: Routine within 24 hrs; Call back #: [id number]; 30 y/o w/ situs inversus totalis and CCTGA, p/w persistent intermittent chest heaviness. trop/EKG neg. Previous Ziopatch w/ NS SVT recntly started ...    Narrative    Nancy Matthews PA-C     9/19/2019 11:53 AM        [facility]   Cardiology Inpatient Consultation    September 19, 2019    Reason for Consult:  A cardiology consult was requested by Jennifer Cary PA-C from the   medicine service to provide clinical guidance regarding   intermittent chest heaviness in the setting of situs inversus   totalis and CCTGA (congenitally corrected transposition of the   great arteries).    HPI:   Robert Ge is a 29 year old male with PMH   significant for situs inversus totalis and CCTGA whom presented   to the emergency department 09/18/2019 with complaints of chest   heaviness. The patient states he was lying down in bed at   approximately 10pm on Tuesday.  He noted chest pressure located   beneath his sternum which did not radiate.  He rates this pain   approximately a 4/10.  He denies associated lightheadedness,   dizziness, palpitations, shortness of breath, nausea,   diaphoresis, and numbness/paresthesias.  He states that this pain   lasted approximately 1 minute and resolved on it's own.  He was   able to sleep, but throughout Wednesday had episodes similar to   the above described (however lasting seconds rather than a full   minute) every hour.  The patient states he has had two previous   episodes similar to the above described; one in April, at which   point in time he was evaluated by cardiology, and one in May.  He   notes that he is under a significant amount of stress as compared   to his baseline and that this was similarly the case in both   April and May. In the last two weeks, the patient denies   unexplained nausea/vomiting, fever/chills, sinus congestion,   lightheadedness/dizziness, shortness of  "breath, palpitations,   abdominal pain, dysuria, and LE pain/swelling.  The patient went   on a 5 mile run Saturday and had no appreciable chest discomfort.    Upon arrival to the emergency department Wednesday evening, his   blood pressure was 139/93, he was sating well on RA and his pulse   was between 50-80. The patient's labs were unremarkable including   his TSH and magnesium. The patient's d-dimer was 0.9; a CT PE was   obtained which showed no appreciable PE and known situs inversus   with dextrocardia. The patient's EKG shows no acute ST segment   changes with T-wave inversion of aVL only; this is unchanged from   the patient's previous EKGs.  The patient had 3 negative   troponin's.  At present, the patient continues to have   intermittent chest pressure approximately once per hour.  He   states he is otherwise feeling \"fine\". The patient has had no   significant events on telemetry; specifically, he has had no   PSVT.    The patient's cardiac history is significant for situs inversus   totalis with dextrocardia, moderate RV enlargement with mild   decrease of RV function, mild AV regurgitation of the right side   of his heart, and hypertriglyceridemia.  The patient was   previously followed by a cardiologist in his home country of   [place] (he is here studying for his PhD). He recently established   care with Dr. GABRIEL Almaraz of the [facility] team   05/2019 due to the episode of chest palpitations/chest pain in   April of 2019. He wore a ZioPatch monitor x14 days; this showed 3   supraventricular tachycardia runs, the longest and fastest run   spanning 16.6 seconds with a maximum heart rate of 133 bpm. One   of the patient's symptomatic episodes of chest pain/discomfort   corresponded to the above described NSVT.  The patient did,   however, have several \"symptomatic events\" which correlated with   sinus rhythm. In addition, he underwent cardiac MRI which showed   L-TGA with situs inversus, " mild LV/RV dysfunction, mild AI and   mild PI. In addition, he recently underwent cardiopulmonary   stress testing 2019; he exhibited no symptoms of   pain/palpitations/SOB during the test.  The patient's EKG was   non-diagnostic in regards to ischemia. The patient's exercised   for a total 14:43; the test was terminated secondary to leg   fatigue.    Review of Systems:    Complete review of systems was performed and negative except per   HPI.      PMH:  Pertinent medical history as described above.    Active Problems:  Patient Active Problem List    Diagnosis Date Noted     Situs inversus totalis 2019     Priority: Medium     [facility]     Priority: Medium     For urgent after hours needs, please call [phone] and ask   to speak with the Adult Congenital Heart Disease Physician on   call - mention job code [id number].         Social History:  Social History     Tobacco Use     Smoking status: Former Smoker     Packs/day: 0.50     Years: 10.00     Pack years: 5.00     Last attempt to quit: 2014     Years since quittin.0     Smokeless tobacco: Never Used   Substance Use Topics     Alcohol use: Yes     Comment: 2-3 beers daily     Drug use: Never     Family History:  History reviewed. No pertinent family history.    Medications:    [START ON 2019] aspirin  81 mg Oral Daily     sodium chloride (PF)  3 mL Intracatheter Q8H       sodium chloride 125 mL/hr at 19 0606     Physical Exam:  Temp:  [97.9  F (36.6  C)-98.6  F (37  C)] 97.9  F (36.6  C)  Pulse:  [47-66] 58  Heart Rate:  [54-75] 60  Resp:  [11-18] 16  BP: (106-139)/(60-93) 113/60  SpO2:  [95 %-100 %] 97 %     GEN: Pleasant, no acute distress  HEENT: No evidence of cranial trauma.  CV: Heart tones auscultated at the right sternal border and the   right midclavicular line, in the 5th intercostal space. Heart   sounds were regular in rate and rhythm.  Seemingly normal s1/s2.    I could not appreciable discernable murmur, rub, no gallop.  The   patient's JVP was not elevated. Of note, patient had episode of   chest pressure during physical exam; there were no changes to his   heart tones during said episode.  CHEST: clear to ausculation bilaterally, no rales or wheezing  ABD: soft, non-tender, normal active bowel sounds  EXTR: PT pulses 3+ bilaterally.  No appreciable lower extremity   edema  NEURO: alert oriented, speech fluent/appropriate, motor grossly   nonfocal    Diagnostics:  Lab Results   Component Value Date    TROPI <0.015 09/19/2019    TROPI <0.015 09/19/2019    TROPI <0.015 09/18/2019     EKG 09/19/2019:      EKG 05/14/2019:        Assessment & Plan   Robert Ge is a 29 year old male with PMH   significant for situs inversus totalis and CCTGA whom presented   to the emergency department 09/18/2019 with complaints of chest   heaviness.     Chest Pressure  Troponin negative x3.  EKG unchanged from baseline.  Recent   cardiopulmonary stress testing is within normal limits.    DDx: very low likelihood of ACS with this presentation.  Possible   secondary to ?stress  - order congential ECHO; if normal, no other intervention needed   prior to discharge  - outpatient cardiology follow up within 1 month      I have discussed the above with Dr. GABRIEL Almaraz and Dr. Hallie Dougherty.    Thank you for consulting the cardiovascular services at the   [facility]. Please do not hesitate to   call us with any questions.     Nancy Matthews PA-C  [facility] Cardiology Consult Team       ECHO Congenital Transthoracic (TTE)    Narrative    [id number]  [id number]  [id number]  [id number]^RAHUL^ISSA^HALLIE                                                                   Study ID: [id number]                                                 [facility]                                                   [address]                                                [place] [address]                                                Phone: [phone]                                Pediatric Echocardiogram  _____________________________________________________________________________  __     Name: LANNY DUCKWORTH  Study Date: 2019 12:37 PM             Patient Location: [facility]  MRN: [id number]                             Age: 29 yrs  : 1990                             BP: 105/68 mmHg  Gender: Male                                HR: 57  Patient Class: Obstetrics                   Height: 181 cm  Ordering Provider: ISSA KAY             Weight: 81 kg  Referring Provider: SANTOS MARIN   BSA: 2.0 m2  Performed By: Roney Bruce  Report approved by: Crista Rojas MD  Reason For Study: Chest Pain  _____________________________________________________________________________  __     ------CONCLUSIONS------  CCTGA, situs inversus, AV discordant, ventriculoarterial connection is  discordant, right aortic arch.     Technically difficult study due to poor acoustic windows. The cardiac apex is  midline. The right sided AV valve has mild+ regurgitation. The systemic right  ventricle appears to be moderately enlarged with low normal systolic  function.There is trivial regurgitation of the left sided AV valve with low  velocity pressure estimate. Pulmonary LV function is normal. Normal abdominal  aortic upstroke signal.     _____________________________________________________________________________  __        Technical information:  A complete two dimensional, MMODE, spectral and color Doppler transthoracic  echocardiogram is performed. Images are obtained from parasternal, apical,  subcostal and suprasternal notch views. Technically difficult study due to  poor acoustic windows. Images from the parasternal window were difficult to  obtain and are  suboptimal in quality. The apical views were difficult to  obtain and are suboptimal in quality. The subcostal views were difficult to  obtain and are suboptimal in quality. The suprasternal notch views were  difficult to obtain and are suboptimal in quality. Prior echocardiogram  available for comparison. ECG tracing shows regular rhythm.     Segmental Anatomy:  CCTGA, situs inversus, AV connection is discordant, the systemic (morphologic  right) is attached  to the left atrium and the subpulmonic (morphologic left) ventricle is  attached to the right atrium, ventriculoarterial connection is discordant,  right aortic arch with the aorta arising from the  morphologic left ventricle. (Seen on MRI 9/06/2019).     Systemic and pulmonary veins:  The systemic venous return is normal. Color flow demonstrates flow from at  least one pulmonary vein entering the left atrium.     Atria and atrial septum:  Cannot rule out atrial level shunts with the images provided.        Atrioventricular valves:  The right sided AV valve has mild regurgitation. There is trivial  regurgitation of the left sided AV valve with low velocity pressure estimate.     Ventricles and Ventricular Septum:  The cardiac apex is midline. Systelmic right ventricle is enlarged with low  normal function.  Pulmonary LV function is normal and has normal size. No obvious ventricular  level shunting.     Outflow tracts:  Congenitally corrected transposition of the great arteries (L-TGA).     Great arteries:  The branch pulmonary arteries are not seen with this study. The right  pulmonary artery is not seen with this study. The left pulmonary artery is not  seen with this study. Normal ascending aorta. There is unobstructed antegrade  flow in the ascending, transverse arch, descending thoracic and abdominal  aorta. There is a right aortic arch.     Arterial Shunts:  No obvious arterial level shunting.     Coronaries:  The coronary arteries are not well  visualized.        Effusions, catheters, cannulas and leads:  No pericardial effusion.     desc Ao max kota: 112.4 cm/sec  desc Ao max P.1 mmHg        Report approved by: Gay Rosales 2019 02:48 PM         Recent Results (from the past 48 hour(s))   XR Chest 2 Views    Narrative    XR CHEST 2 VW  2019 10:28 PM      HISTORY: chest pain    COMPARISON: MRI 2018    FINDINGS: Findings of complete situs inversus. No pleural effusion or  pneumothorax. No focal airspace opacity. No displaced rib fracture.      Impression    IMPRESSION: No acute cardiopulmonary process. Complete Situs inversus.    I have personally reviewed the examination and initial interpretation  and I agree with the findings.    JOHN VILLAREAL MD   CT Chest Pulmonary Embolism w Contrast    Narrative    Exam: Chest CT Angiogram with 3-dimensional reconstruction  2019.    Comparison: Radiograph yesterday     Clinical History: :PE suspected, intermediate prob, positive D-dimer;  positive d-dimer. Returned from [place] on  with persistent sternal  chest pain.    Technique: Volumetric helical acquisition of the chest were obtained  following pulmonary embolism protocol from the lung apices through the  upper abdomen. The pulmonary arteries are well-opacified to evaluate  for pulmonary embolism.    3-dimensional post processed angiographic images were reconstructed,  archived to PACS and used in the interpretation of this study.    Findings:  Mediastinum/hilum: Situs inversus with dextrocardia. Right-sided  aortic arch with mirror image branching. The heart size is normal. The  great vessels are normal in caliber. There is no evidence of a filling  defect in the pulmonary arteries to suggest a pulmonary embolism. No  evidence of axillary, mediastinal or hilar lymphadenopathy by size  criteria. No pericardial effusion is noted. Narrowing of the right  subclavian vein with multiple collaterals in the right  arm.    Lungs/pleura: The visualized portion of the lungs are clear. No  evidence of pleural effusion is noted.    Upper Abdomen: Unremarkable appearance of the upper abdomen.    Bones/Soft tissues: No suspicious bony lesions.      Impression    Impression:   1. No pulmonary embolism or other acute abnormality in the chest.  2. Situs inversus with dextrocardia and right-sided aortic arch with  mirror image branching, surgically corrected transposition of the  great vessels.         I have personally reviewed the examination and initial interpretation  and I agree with the findings.    JOHN VILLAREAL MD                Pending Results:   Unresulted Labs Ordered in the Past 30 Days of this Admission     No orders found for last 31 day(s).                  Discharge Instructions and Follow-Up:     Discharge Procedure Orders   Reason for your hospital stay   Order Comments: You were admitted to the observation unit for evaluation of your chest pain.  You had no EKG changes, labs checking for heart damage were negative, your echo was unchanged from the images on your MRI.  Cardiology was consulted and recommended discharge to home and follow up with cardiology in 1 month.     Activity   Order Comments: Your activity upon discharge: activity as tolerated     Order Specific Question Answer Comments   Is discharge order? Yes      Adult [facility] Follow-up and recommended labs and tests   Order Comments: Follow up with Dr. Almaraz , at [facility], within 1 month.  for hospital follow- up. No follow up labs or test are needed.    Appointments on [place] and/or [facility] (with [facility] or [facility] provider or service). Call [phone] if you haven't heard regarding these appointments within 7 days of discharge.     When to contact your care team   Order Comments: Return to the ER if you have new or worsening chest pain, shortness of breath, jaw or arm pain, or fainting.     Diet   Order Comments: Follow this diet upon  discharge: Orders Placed This Encounter      Regular Diet Adult     Order Specific Question Answer Comments   Is discharge order? Yes           Attestation:  EDA Romo CNP.

## 2019-09-19 NOTE — PLAN OF CARE
PRIOR TO DISCHARGE     Comments: List all goals to be met before discharge home:       - Serial troponins and stress test complete: yes  - Seen and cleared by consultant if applicable: no   - Adequate pain control on oral analgesia: yes, denies pain.  - Vital signs normal or at patient baseline: no, pt bradycardic. Pt cannot remember if he is typically bradycardic.   - Safe disposition plan has been identified: no  - Nurse to notify provider when observation goals have been met and patient is ready for discharge.

## 2019-10-09 ASSESSMENT — ENCOUNTER SYMPTOMS
PALPITATIONS: 1
SYNCOPE: 0
HYPERTENSION: 0
LEG PAIN: 0
EXERCISE INTOLERANCE: 0
HYPOTENSION: 0
ORTHOPNEA: 0
LIGHT-HEADEDNESS: 0
SLEEP DISTURBANCES DUE TO BREATHING: 0

## 2019-10-11 ENCOUNTER — OFFICE VISIT (OUTPATIENT)
Dept: CARDIOLOGY | Facility: CLINIC | Age: 29
End: 2019-10-11
Attending: INTERNAL MEDICINE
Payer: COMMERCIAL

## 2019-10-11 VITALS
DIASTOLIC BLOOD PRESSURE: 79 MMHG | OXYGEN SATURATION: 96 % | HEART RATE: 63 BPM | BODY MASS INDEX: 24.92 KG/M2 | SYSTOLIC BLOOD PRESSURE: 118 MMHG | HEIGHT: 71 IN | WEIGHT: 178 LBS

## 2019-10-11 DIAGNOSIS — Q24.9 CONGENITAL HEART ANOMALY: ICD-10-CM

## 2019-10-11 DIAGNOSIS — R00.0 TACHYCARDIA: Primary | ICD-10-CM

## 2019-10-11 DIAGNOSIS — Q89.3 SITUS INVERSUS TOTALIS: Chronic | ICD-10-CM

## 2019-10-11 DIAGNOSIS — I36.1 NONRHEUMATIC TRICUSPID VALVE REGURGITATION: ICD-10-CM

## 2019-10-11 PROCEDURE — 93005 ELECTROCARDIOGRAM TRACING: CPT | Mod: ZF

## 2019-10-11 PROCEDURE — 99214 OFFICE O/P EST MOD 30 MIN: CPT | Mod: ZP | Performed by: INTERNAL MEDICINE

## 2019-10-11 PROCEDURE — G0463 HOSPITAL OUTPT CLINIC VISIT: HCPCS

## 2019-10-11 PROCEDURE — 93010 ELECTROCARDIOGRAM REPORT: CPT | Mod: ZP | Performed by: INTERNAL MEDICINE

## 2019-10-11 ASSESSMENT — PAIN SCALES - GENERAL: PAINLEVEL: NO PAIN (0)

## 2019-10-11 ASSESSMENT — MIFFLIN-ST. JEOR: SCORE: 1789.53

## 2019-10-11 NOTE — PROGRESS NOTES
Service Date: 10/11/2019      HISTORY OF PRESENT ILLNESS:  Mr. Hearn is a pleasant 29-year-old gentleman who is well known to me.  I met him in 05/2019.  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  He has moderate RV enlargement, which is his systemic ventricle, with mildly decreased function, but no significant AV valve regurgitation.  He has also had a history of SVT that at the present time, we are managing just with beta blockade.      Mr. Hearn recently was seen in the Emergency Department as he had an episode of chest pain.  He reports this episode was different than he has had in the past.  It occurred in the evening.  He was working at that time, and he got these sharp pains that occurred on and off over 2 hours.  When he went in, they did a CTP protocol that was negative.  Labs were unremarkable.  An echocardiogram was normal and he was discharged.      This week, he ran 6-1/2 miles and felt good with that without any chest pain or discomfort.        He has not had any coronary evaluation.  His risk factor for coronary artery disease, just includes prior nicotine dependence.  He may have some reflux and it is possible that that could play a role.  He is also very stressed as he is looking to get a job.  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  It is notable that we have also done a cardiopulmonary stress test with him that was done on 09/06/2019.  He went 113% of predicted.  His RVR was 1.16 and he had no cardiac limitation with exercise.      IMPRESSION, REPORT, PLAN:   1.  Dextrocardia with situs inversus totalis.   2.  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   3.  Moderate RV enlargement (systemic ventricle) with mildly decreased function without any significant AV valve regurgitation.   4.  SVT, paroxysmal, on beta blockade.   5.   Hypertriglyceridemia.   6.  Episode of chest pain.      DISCUSSION:  It was a pleasure to see Mr. Hearn in followup.  Today, I discussed his ER visit in detail.  He has not had any recurrence of his symptoms, but it did cause him a bit of concern.  He is wondering if it could have been the stress, which is certainly possible.  That being said, we have not evaluated his coronary anatomy, and it would be reasonable to do a coronary CTA just to be certain we are not missing anomalous coronaries or something of that nature given that he has had this periodically.  He will continue to work on exercising.  We can call with the results of the coronary CTA.        It was a pleasure to see him.  Please do not hesitate to contact me with any questions or concerns.         SANTOS MARIN MD             D: 10/11/2019   T: 10/11/2019   MT: YAKELIN      Name:     LANNY DUCKWORTH   MRN:      [id number]        Account:      [id number]   :      1990           Service Date: 10/11/2019      Document: [id number]

## 2019-10-11 NOTE — LETTER
10/11/2019      RE: Robert Ge  [address]  [facility] [address]       Dear Colleague,    Thank you for the opportunity to participate in the care of your patient, Robert Ge, at the [facility] at [facility]. Please see a copy of my visit note below.    CARDIOLOGY CONSULTATION:     Please see dictated note    PAST MEDICAL HISTORY:  No past medical history on file.    CURRENT MEDICATIONS:  Current Outpatient Medications   Medication Sig Dispense Refill     metoprolol succinate ER (TOPROL XL) 25 MG 24 hr tablet Take 1 tablet (25 mg) by mouth daily 90 tablet 3       PAST SURGICAL HISTORY:  No past surgical history on file.    ALLERGIES  Patient has no known allergies.    FAMILY HX:  No family history on file.    SOCIAL HX:  Social History     Socioeconomic History     Marital status: Single     Spouse name: None     Number of children: None     Years of education: None     Highest education level: None   Occupational History     None   Social Needs     Financial resource strain: None     Food insecurity:     Worry: None     Inability: None     Transportation needs:     Medical: None     Non-medical: None   Tobacco Use     Smoking status: Former Smoker     Packs/day: 0.50     Years: 10.00     Pack years: 5.00     Last attempt to quit: 2014     Years since quittin.0     Smokeless tobacco: Never Used   Substance and Sexual Activity     Alcohol use: Yes     Comment: 2-3 beers daily     Drug use: Never     Sexual activity: None   Lifestyle     Physical activity:     Days per week: None     Minutes per session: None     Stress: None   Relationships     Social connections:     Talks on phone: None     Gets together: None     Attends Sabianist service: None     Active member of club or organization: None     Attends meetings of clubs or organizations: None     Relationship status: None     Intimate partner violence:     Fear of  "current or ex partner: None     Emotionally abused: None     Physically abused: None     Forced sexual activity: None   Other Topics Concern     None   Social History Narrative     None       ROS:  Constitutional: No fever, chills, or sweats. No weight gain/loss.   ENT: No visual disturbance, ear ache, epistaxis, sore throat.   Allergies/Immunologic: Negative.   Respiratory: No cough, hemoptysis.   Cardiovascular: As per HPI.   GI: No nausea, vomiting, hematemesis, melena, or hematochezia.   : No urinary frequency, dysuria, or hematuria.   Integument: Negative.   Psychiatric: Negative.   Neuro: Negative.   Endocrinology: Negative.   Musculoskeletal: No myalgia.    VITAL SIGNS:  /79 (BP Location: Right arm, Patient Position: Chair, Cuff Size: Adult Regular)   Pulse 63   Ht 1.803 m (5' 11\")   Wt 80.7 kg (178 lb)   SpO2 96%   BMI 24.83 kg/m     Body mass index is 24.83 kg/m .  Wt Readings from Last 2 Encounters:   10/11/19 80.7 kg (178 lb)   09/19/19 81 kg (178 lb 9.2 oz)       PHYSICAL EXAM  Robert Ge IS A 29 year old male.in no acute distress.  HEENT: Unremarkable.  Neck: JVP normal.  Carotids +4/4 bilaterally without bruits.  Lungs: CTA.  Cor: RRR. Normal S1 and S2.  No murmur, rub, or gallop.  PMI in Lf 5th ICS.  Abd: Soft, nontender, nondistended.  NABS.  No pulsatile mass.  Extremities: No C/C/E.  Pulses +4/4 symmetric in upper and lower extremities.  Neuro: Grossly intact.    LABS    Lab Results   Component Value Date    WBC 7.3 09/18/2019     Lab Results   Component Value Date    RBC 5.56 09/18/2019     Lab Results   Component Value Date    HGB 16.5 09/18/2019     Lab Results   Component Value Date    HCT 49.4 09/18/2019     No components found for: MCT  Lab Results   Component Value Date    MCV 89 09/18/2019     Lab Results   Component Value Date    MCH 29.7 09/18/2019     Lab Results   Component Value Date    MCHC 33.4 09/18/2019     Lab Results   Component Value Date    RDW 12.0 " 09/18/2019     Lab Results   Component Value Date     09/18/2019      Recent Labs   Lab Test 09/18/19  2201 05/10/19  1223    138   POTASSIUM 3.8 4.2   CHLORIDE 107 107   CO2 29 23   ANIONGAP 5 8   GLC 84 91   BUN 12 14   CR 1.10 0.88   CAIT 9.1 9.5     Recent Labs   Lab Test 05/10/19  1223   CHOL 205*   HDL 45   LDL 93   TRIG 331*   NHDL 159*        GABRIEL Almaraz MD     Service Date: 10/11/2019      HISTORY OF PRESENT ILLNESS:  Mr. Hearn is a pleasant 29-year-old gentleman who is well known to me.  I met him in 05/2019.  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  He has moderate RV enlargement, which is his systemic ventricle, with mildly decreased function, but no significant AV valve regurgitation.  He has also had a history of SVT that at the present time, we are managing just with beta blockade.      Mr. Hearn recently was seen in the Emergency Department as he had an episode of chest pain.  He reports this episode was different than he has had in the past.  It occurred in the evening.  He was working at that time, and he got these sharp pains that occurred on and off over 2 hours.  When he went in, they did a CTP protocol that was negative.  Labs were unremarkable.  An echocardiogram was normal and he was discharged.      This week, he ran 6-1/2 miles and felt good with that without any chest pain or discomfort.        He has not had any coronary evaluation.  His risk factor for coronary artery disease, just includes prior nicotine dependence.  He may have some reflux and it is possible that that could play a role.  He is also very stressed as he is looking to get a job.  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  It is notable that we have also done a cardiopulmonary stress test with him that was done on 09/06/2019.  He went 113% of predicted.   His RVR was 1.16 and he had no cardiac limitation with exercise.      IMPRESSION, REPORT, PLAN:   1.  Dextrocardia with situs inversus totalis.   2.  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   3.  Moderate RV enlargement (systemic ventricle) with mildly decreased function without any significant AV valve regurgitation.   4.  SVT, paroxysmal, on beta blockade.   5.  Hypertriglyceridemia.   6.  Episode of chest pain.      DISCUSSION:  It was a pleasure to see Mr. Hearn in followup.  Today, I discussed his ER visit in detail.  He has not had any recurrence of his symptoms, but it did cause him a bit of concern.  He is wondering if it could have been the stress, which is certainly possible.  That being said, we have not evaluated his coronary anatomy, and it would be reasonable to do a coronary CTA just to be certain we are not missing anomalous coronaries or something of that nature given that he has had this periodically.  He will continue to work on exercising.  We can call with the results of the coronary CTA.        It was a pleasure to see him.  Please do not hesitate to contact me with any questions or concerns.         SANTOS MARIN MD             D: 10/11/2019   T: 10/11/2019   MT: YAKELIN      Name:     LANNY DUCKWORTH   MRN:      [id number]        Account:      [id number]   :      1990           Service Date: 10/11/2019      Document: [id number]

## 2019-10-11 NOTE — PROGRESS NOTES
CARDIOLOGY CONSULTATION:     Please see dictated note    PAST MEDICAL HISTORY:  No past medical history on file.    CURRENT MEDICATIONS:  Current Outpatient Medications   Medication Sig Dispense Refill     metoprolol succinate ER (TOPROL XL) 25 MG 24 hr tablet Take 1 tablet (25 mg) by mouth daily 90 tablet 3       PAST SURGICAL HISTORY:  No past surgical history on file.    ALLERGIES  Patient has no known allergies.    FAMILY HX:  No family history on file.    SOCIAL HX:  Social History     Socioeconomic History     Marital status: Single     Spouse name: None     Number of children: None     Years of education: None     Highest education level: None   Occupational History     None   Social Needs     Financial resource strain: None     Food insecurity:     Worry: None     Inability: None     Transportation needs:     Medical: None     Non-medical: None   Tobacco Use     Smoking status: Former Smoker     Packs/day: 0.50     Years: 10.00     Pack years: 5.00     Last attempt to quit: 2014     Years since quittin.0     Smokeless tobacco: Never Used   Substance and Sexual Activity     Alcohol use: Yes     Comment: 2-3 beers daily     Drug use: Never     Sexual activity: None   Lifestyle     Physical activity:     Days per week: None     Minutes per session: None     Stress: None   Relationships     Social connections:     Talks on phone: None     Gets together: None     Attends Zoroastrian service: None     Active member of club or organization: None     Attends meetings of clubs or organizations: None     Relationship status: None     Intimate partner violence:     Fear of current or ex partner: None     Emotionally abused: None     Physically abused: None     Forced sexual activity: None   Other Topics Concern     None   Social History Narrative     None       ROS:  Constitutional: No fever, chills, or sweats. No weight gain/loss.   ENT: No visual disturbance, ear ache, epistaxis, sore throat.  "  Allergies/Immunologic: Negative.   Respiratory: No cough, hemoptysis.   Cardiovascular: As per HPI.   GI: No nausea, vomiting, hematemesis, melena, or hematochezia.   : No urinary frequency, dysuria, or hematuria.   Integument: Negative.   Psychiatric: Negative.   Neuro: Negative.   Endocrinology: Negative.   Musculoskeletal: No myalgia.    VITAL SIGNS:  /79 (BP Location: Right arm, Patient Position: Chair, Cuff Size: Adult Regular)   Pulse 63   Ht 1.803 m (5' 11\")   Wt 80.7 kg (178 lb)   SpO2 96%   BMI 24.83 kg/m    Body mass index is 24.83 kg/m .  Wt Readings from Last 2 Encounters:   10/11/19 80.7 kg (178 lb)   09/19/19 81 kg (178 lb 9.2 oz)       PHYSICAL EXAM  Robert Ge IS A 29 year old male.in no acute distress.  HEENT: Unremarkable.  Neck: JVP normal.  Carotids +4/4 bilaterally without bruits.  Lungs: CTA.  Cor: RRR. Normal S1 and S2.  No murmur, rub, or gallop.  PMI in Lf 5th ICS.  Abd: Soft, nontender, nondistended.  NABS.  No pulsatile mass.  Extremities: No C/C/E.  Pulses +4/4 symmetric in upper and lower extremities.  Neuro: Grossly intact.    LABS    Lab Results   Component Value Date    WBC 7.3 09/18/2019     Lab Results   Component Value Date    RBC 5.56 09/18/2019     Lab Results   Component Value Date    HGB 16.5 09/18/2019     Lab Results   Component Value Date    HCT 49.4 09/18/2019     No components found for: MCT  Lab Results   Component Value Date    MCV 89 09/18/2019     Lab Results   Component Value Date    MCH 29.7 09/18/2019     Lab Results   Component Value Date    MCHC 33.4 09/18/2019     Lab Results   Component Value Date    RDW 12.0 09/18/2019     Lab Results   Component Value Date     09/18/2019      Recent Labs   Lab Test 09/18/19  2201 05/10/19  1223    138   POTASSIUM 3.8 4.2   CHLORIDE 107 107   CO2 29 23   ANIONGAP 5 8   GLC 84 91   BUN 12 14   CR 1.10 0.88   CAIT 9.1 9.5     Recent Labs   Lab Test 05/10/19  1223   CHOL 205*   HDL 45   LDL " 93   TRIG 331*   On license of [facility] 159*        GABRIEL Almaraz MD

## 2019-10-11 NOTE — PATIENT INSTRUCTIONS
You were seen today in the Adult Congenital and Cardiovascular Genetics Clinic at the [facility].    Cardiology Providers you saw during your visit:  GABRIEL Almaraz MD    Diagnosis:  History of situs inversus totalis and moderate RV enlargement    Results:  GABRIEL Almaraz MD reviewed the results of your EKG testing today in clinic.    Recommendations:    1. Continue to eat a heart healthy, low salt diet.  2. Continue to get 20-30 minutes of aerobic activity, 4-5 days per week.  Examples of aerobic activity include walking, running, swimming, cycling, etc.  3. Continue to observe good oral hygiene, with regular dental visits.  4. We scheduled you for a coronary CTA. We will call you with the results.  5. Please take your beta blocker the day of your CTA.  6. Nothing to eat or drink 3 hours prior to exam.  7. No alcohol, caffeine, or tobacco 12 hours prior to exam.    SBE prophylaxis:   Yes____  No_X___    Lifelong Bacterial Endocarditis Prophylaxis:  YES____  NO____    If YES is checked, follow the recommendations outlined below:  1. Take antibiotic(s) prior to recommended dental procedures and procedures on the respiratory tract or with infected skin, muscle or bones. SBE prophylaxis is not needed for routine GI and  procedures (ie. Colonoscopy or vaginal delivery)  2. Observe good oral hygiene daily, as advised by your dentist. Get regular professional dental care.  3. Keep cuts clean.  4. Infections should be treated promptly.  5. Symptoms of Infective Endocarditis could include: fever lasting more than 4-5 days or a recurrent fever that initially resolves but returns within 1-2 days)      Exercise restrictions:   Yes__X__  No____         If yes, list restrictions:  Must be allowed to rest if fatigued or SOB      Work restrictions:  Yes____  No_X___         If yes, list restrictions:    FASTING CHOLESTEROL was checked in the last 5 years YES_X__  NO___ (2019)  Continue to eat a heart healthy,  low salt diet.         ____ Fasting lipid panel order today         ____ No changes in medications          ____ I recommend the following changes in your cholesterol medications.:          ____ Please follow up for cholesterol screening at your primary care physician      Follow-up:  Follow up with Dr. Almaraz and an echocardiogram in one year.    If you have questions or concerns please contact us at:    Diane Masters, MSN, RN, CNL    Irish Pappas (Scheduling)  Nurse Care Coordinator     Clinic   Adult Congenital and CV Genetics   Adult Congenital and CV Genetic  [facility]   [facility]  (P) [phone]     (P) [phone]  [email or web address]   (F) [phone]        For after hours urgent needs, call [phone] and ask to speak to the Adult Congenital Physician on call.  Mention Job Code 0401.    For emergencies call 911.    [facility]  [facility]   [facility]  Mail Code [id number]  [address] [place]  [address]

## 2019-10-11 NOTE — NURSING NOTE
Chief Complaint   Patient presents with     Follow Up     29 year old male with history of situs inversus totalis and moderate RV enlargement presenting for follow up.     Vitals were taken and medications were reconciled. EKG was performed.    Diane Whalen CMA    2:13 PM

## 2019-10-11 NOTE — NURSING NOTE
Cardiac Testing: Patient given instructions regarding coronary CTA . Discussed purpose, preparation, procedure and when to expect results reported back to the patient. Patient demonstrated understanding of this information and agreed to call with further questions or concerns.  Diet: Patient instructed regarding a heart healthy diet, including discussion of reduced fat and sodium intake. Patient demonstrated understanding of this information and agreed to call with further questions or concerns.  Med Reconcile: Reviewed and verified all current medications with the patient. The updated medication list was printed and given to the patient.  Return Appointment: Patient given instructions regarding scheduling next clinic visit. Patient demonstrated understanding of this information and agreed to call with further questions or concerns.  Patient stated he understood all health information given and agreed to call with further questions or concerns.

## 2019-10-14 LAB — INTERPRETATION ECG - MUSE: NORMAL

## 2019-11-13 ENCOUNTER — MYC REFILL (OUTPATIENT)
Dept: CARDIOLOGY | Facility: CLINIC | Age: 29
End: 2019-11-13

## 2019-11-13 DIAGNOSIS — Q24.9 CONGENITAL HEART ANOMALY: ICD-10-CM

## 2019-11-13 RX ORDER — METOPROLOL SUCCINATE 25 MG/1
25 TABLET, EXTENDED RELEASE ORAL DAILY
Qty: 90 TABLET | Refills: 3 | Status: CANCELLED | OUTPATIENT
Start: 2019-11-13

## 2020-03-11 ENCOUNTER — HEALTH MAINTENANCE LETTER (OUTPATIENT)
Age: 30
End: 2020-03-11

## 2021-01-03 ENCOUNTER — HEALTH MAINTENANCE LETTER (OUTPATIENT)
Age: 31
End: 2021-01-03

## 2021-04-25 ENCOUNTER — HEALTH MAINTENANCE LETTER (OUTPATIENT)
Age: 31
End: 2021-04-25

## 2021-10-10 ENCOUNTER — HEALTH MAINTENANCE LETTER (OUTPATIENT)
Age: 31
End: 2021-10-10

## 2022-05-21 ENCOUNTER — HEALTH MAINTENANCE LETTER (OUTPATIENT)
Age: 32
End: 2022-05-21

## 2022-09-18 ENCOUNTER — HEALTH MAINTENANCE LETTER (OUTPATIENT)
Age: 32
End: 2022-09-18

## 2023-06-04 ENCOUNTER — HEALTH MAINTENANCE LETTER (OUTPATIENT)
Age: 33
End: 2023-06-04